# Patient Record
Sex: FEMALE | Race: WHITE | HISPANIC OR LATINO | Employment: PART TIME | URBAN - METROPOLITAN AREA
[De-identification: names, ages, dates, MRNs, and addresses within clinical notes are randomized per-mention and may not be internally consistent; named-entity substitution may affect disease eponyms.]

---

## 2018-08-27 ENCOUNTER — RECORDS - HEALTHEAST (OUTPATIENT)
Dept: LAB | Facility: CLINIC | Age: 23
End: 2018-08-27

## 2018-08-27 LAB
CLUE CELLS: ABNORMAL
TRICHOMONAS, WET PREP: ABNORMAL
YEAST, WET PREP: ABNORMAL

## 2018-08-28 LAB — C TRACH DNA SPEC QL PROBE+SIG AMP: NEGATIVE

## 2021-10-09 ENCOUNTER — APPOINTMENT (OUTPATIENT)
Dept: CT IMAGING | Facility: HOSPITAL | Age: 26
End: 2021-10-09
Attending: EMERGENCY MEDICINE

## 2021-10-09 ENCOUNTER — APPOINTMENT (OUTPATIENT)
Dept: RADIOLOGY | Facility: HOSPITAL | Age: 26
End: 2021-10-09

## 2021-10-09 ENCOUNTER — HOSPITAL ENCOUNTER (EMERGENCY)
Facility: HOSPITAL | Age: 26
Discharge: HOME | End: 2021-10-09
Attending: EMERGENCY MEDICINE | Admitting: EMERGENCY MEDICINE

## 2021-10-09 ENCOUNTER — APPOINTMENT (OUTPATIENT)
Dept: ULTRASOUND IMAGING | Facility: HOSPITAL | Age: 26
End: 2021-10-09

## 2021-10-09 VITALS
OXYGEN SATURATION: 99 % | SYSTOLIC BLOOD PRESSURE: 121 MMHG | WEIGHT: 181 LBS | HEART RATE: 69 BPM | TEMPERATURE: 98.4 F | DIASTOLIC BLOOD PRESSURE: 74 MMHG | RESPIRATION RATE: 18 BRPM | BODY MASS INDEX: 32.06 KG/M2

## 2021-10-09 DIAGNOSIS — R10.30 ABDOMINAL PAIN, LOWER: ICD-10-CM

## 2021-10-09 LAB
ALBUMIN SERPL-MCNC: 4.3 G/DL (ref 3.5–5)
ALBUMIN UR-MCNC: NEGATIVE MG/DL
ALP SERPL-CCNC: 90 U/L (ref 45–120)
ALT SERPL W P-5'-P-CCNC: 15 U/L (ref 0–45)
ANION GAP SERPL CALCULATED.3IONS-SCNC: 9 MMOL/L (ref 5–18)
APPEARANCE UR: CLEAR
AST SERPL W P-5'-P-CCNC: 18 U/L (ref 0–40)
BASOPHILS # BLD AUTO: 0 10E3/UL (ref 0–0.2)
BASOPHILS NFR BLD AUTO: 0 %
BILIRUB SERPL-MCNC: 0.4 MG/DL (ref 0–1)
BILIRUB UR QL STRIP: NEGATIVE
BUN SERPL-MCNC: 9 MG/DL (ref 8–22)
CALCIUM SERPL-MCNC: 9.8 MG/DL (ref 8.5–10.5)
CHLORIDE BLD-SCNC: 108 MMOL/L (ref 98–107)
CLUE CELLS: ABNORMAL
CO2 SERPL-SCNC: 25 MMOL/L (ref 22–31)
COLOR UR AUTO: ABNORMAL
CREAT SERPL-MCNC: 0.76 MG/DL (ref 0.6–1.1)
D DIMER PPP FEU-MCNC: <0.27 UG/ML FEU (ref 0–0.5)
EOSINOPHIL # BLD AUTO: 0 10E3/UL (ref 0–0.7)
EOSINOPHIL NFR BLD AUTO: 0 %
ERYTHROCYTE [DISTWIDTH] IN BLOOD BY AUTOMATED COUNT: 12.1 % (ref 10–15)
GFR SERPL CREATININE-BSD FRML MDRD: >90 ML/MIN/1.73M2
GLUCOSE BLD-MCNC: 103 MG/DL (ref 70–125)
GLUCOSE UR STRIP-MCNC: NEGATIVE MG/DL
HCG UR QL: NEGATIVE
HCT VFR BLD AUTO: 43.1 % (ref 35–47)
HGB BLD-MCNC: 14.2 G/DL (ref 11.7–15.7)
HGB UR QL STRIP: ABNORMAL
IMM GRANULOCYTES # BLD: 0.1 10E3/UL
IMM GRANULOCYTES NFR BLD: 0 %
INTERNAL QC OK POCT: NORMAL
KETONES UR STRIP-MCNC: NEGATIVE MG/DL
LEUKOCYTE ESTERASE UR QL STRIP: NEGATIVE
LIPASE SERPL-CCNC: 70 U/L (ref 0–52)
LYMPHOCYTES # BLD AUTO: 1.2 10E3/UL (ref 0.8–5.3)
LYMPHOCYTES NFR BLD AUTO: 8 %
MCH RBC QN AUTO: 30.9 PG (ref 26.5–33)
MCHC RBC AUTO-ENTMCNC: 32.9 G/DL (ref 31.5–36.5)
MCV RBC AUTO: 94 FL (ref 78–100)
MONOCYTES # BLD AUTO: 0.7 10E3/UL (ref 0–1.3)
MONOCYTES NFR BLD AUTO: 5 %
MUCOUS THREADS #/AREA URNS LPF: PRESENT /LPF
NEUTROPHILS # BLD AUTO: 12.1 10E3/UL (ref 1.6–8.3)
NEUTROPHILS NFR BLD AUTO: 87 %
NITRATE UR QL: NEGATIVE
NRBC # BLD AUTO: 0 10E3/UL
NRBC BLD AUTO-RTO: 0 /100
PH UR STRIP: 6 [PH] (ref 5–7)
PLATELET # BLD AUTO: 329 10E3/UL (ref 150–450)
POTASSIUM BLD-SCNC: 4.6 MMOL/L (ref 3.5–5)
PROT SERPL-MCNC: 7.9 G/DL (ref 6–8)
RBC # BLD AUTO: 4.59 10E6/UL (ref 3.8–5.2)
RBC URINE: 173 /HPF
SODIUM SERPL-SCNC: 142 MMOL/L (ref 136–145)
SP GR UR STRIP: 1.02 (ref 1–1.03)
SQUAMOUS EPITHELIAL: 1 /HPF
TRICHOMONAS, WET PREP: ABNORMAL
TROPONIN I SERPL-MCNC: 0.01 NG/ML (ref 0–0.29)
UROBILINOGEN UR STRIP-MCNC: <2 MG/DL
WBC # BLD AUTO: 14 10E3/UL (ref 4–11)
WBC URINE: 2 /HPF
WBC'S/HIGH POWER FIELD, WET PREP: ABNORMAL
YEAST, WET PREP: ABNORMAL

## 2021-10-09 PROCEDURE — 85025 COMPLETE CBC W/AUTO DIFF WBC: CPT | Performed by: STUDENT IN AN ORGANIZED HEALTH CARE EDUCATION/TRAINING PROGRAM

## 2021-10-09 PROCEDURE — 250N000011 HC RX IP 250 OP 636: Performed by: PHYSICIAN ASSISTANT

## 2021-10-09 PROCEDURE — 87491 CHLMYD TRACH DNA AMP PROBE: CPT | Performed by: EMERGENCY MEDICINE

## 2021-10-09 PROCEDURE — 76830 TRANSVAGINAL US NON-OB: CPT

## 2021-10-09 PROCEDURE — 96361 HYDRATE IV INFUSION ADD-ON: CPT

## 2021-10-09 PROCEDURE — 96375 TX/PRO/DX INJ NEW DRUG ADDON: CPT

## 2021-10-09 PROCEDURE — 250N000011 HC RX IP 250 OP 636: Performed by: EMERGENCY MEDICINE

## 2021-10-09 PROCEDURE — 83690 ASSAY OF LIPASE: CPT | Performed by: STUDENT IN AN ORGANIZED HEALTH CARE EDUCATION/TRAINING PROGRAM

## 2021-10-09 PROCEDURE — 258N000003 HC RX IP 258 OP 636: Performed by: PHYSICIAN ASSISTANT

## 2021-10-09 PROCEDURE — 85379 FIBRIN DEGRADATION QUANT: CPT | Performed by: PHYSICIAN ASSISTANT

## 2021-10-09 PROCEDURE — 71046 X-RAY EXAM CHEST 2 VIEWS: CPT

## 2021-10-09 PROCEDURE — 96374 THER/PROPH/DIAG INJ IV PUSH: CPT | Mod: 59

## 2021-10-09 PROCEDURE — 81025 URINE PREGNANCY TEST: CPT | Performed by: EMERGENCY MEDICINE

## 2021-10-09 PROCEDURE — 99285 EMERGENCY DEPT VISIT HI MDM: CPT | Mod: 25

## 2021-10-09 PROCEDURE — 81001 URINALYSIS AUTO W/SCOPE: CPT | Performed by: STUDENT IN AN ORGANIZED HEALTH CARE EDUCATION/TRAINING PROGRAM

## 2021-10-09 PROCEDURE — 36415 COLL VENOUS BLD VENIPUNCTURE: CPT | Performed by: PHYSICIAN ASSISTANT

## 2021-10-09 PROCEDURE — 93005 ELECTROCARDIOGRAM TRACING: CPT | Performed by: PHYSICIAN ASSISTANT

## 2021-10-09 PROCEDURE — 84484 ASSAY OF TROPONIN QUANT: CPT | Performed by: PHYSICIAN ASSISTANT

## 2021-10-09 PROCEDURE — 82040 ASSAY OF SERUM ALBUMIN: CPT | Performed by: STUDENT IN AN ORGANIZED HEALTH CARE EDUCATION/TRAINING PROGRAM

## 2021-10-09 PROCEDURE — 87210 SMEAR WET MOUNT SALINE/INK: CPT | Performed by: EMERGENCY MEDICINE

## 2021-10-09 PROCEDURE — 74177 CT ABD & PELVIS W/CONTRAST: CPT

## 2021-10-09 PROCEDURE — 81025 URINE PREGNANCY TEST: CPT | Performed by: STUDENT IN AN ORGANIZED HEALTH CARE EDUCATION/TRAINING PROGRAM

## 2021-10-09 RX ORDER — MORPHINE SULFATE 4 MG/ML
4 INJECTION, SOLUTION INTRAMUSCULAR; INTRAVENOUS ONCE
Status: COMPLETED | OUTPATIENT
Start: 2021-10-09 | End: 2021-10-09

## 2021-10-09 RX ORDER — OXYCODONE AND ACETAMINOPHEN 5; 325 MG/1; MG/1
1 TABLET ORAL ONCE
Status: DISCONTINUED | OUTPATIENT
Start: 2021-10-09 | End: 2021-10-09

## 2021-10-09 RX ORDER — IOPAMIDOL 755 MG/ML
100 INJECTION, SOLUTION INTRAVASCULAR ONCE
Status: COMPLETED | OUTPATIENT
Start: 2021-10-09 | End: 2021-10-09

## 2021-10-09 RX ORDER — ONDANSETRON 2 MG/ML
4 INJECTION INTRAMUSCULAR; INTRAVENOUS ONCE
Status: COMPLETED | OUTPATIENT
Start: 2021-10-09 | End: 2021-10-09

## 2021-10-09 RX ORDER — TIZANIDINE 2 MG/1
2 TABLET ORAL
COMMUNITY

## 2021-10-09 RX ORDER — ONDANSETRON 4 MG/1
4 TABLET, ORALLY DISINTEGRATING ORAL ONCE
Status: DISCONTINUED | OUTPATIENT
Start: 2021-10-09 | End: 2021-10-09

## 2021-10-09 RX ADMIN — ONDANSETRON 4 MG: 2 INJECTION INTRAMUSCULAR; INTRAVENOUS at 13:45

## 2021-10-09 RX ADMIN — MORPHINE SULFATE 4 MG: 4 INJECTION INTRAVENOUS at 13:46

## 2021-10-09 RX ADMIN — IOPAMIDOL 100 ML: 755 INJECTION, SOLUTION INTRAVENOUS at 15:50

## 2021-10-09 RX ADMIN — SODIUM CHLORIDE 1000 ML: 9 INJECTION, SOLUTION INTRAVENOUS at 13:43
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
## 2021-10-09 NOTE — ED TRIAGE NOTES
"Lower abdominal cramping \"way worse than my usual menstrual cramps\" along with some minor vaginal bleeding. Denies nausea, endorses 1 episode of emesis this AM. Denies CP, Denies SOB, Denies LOC, Denies blood in stool, denies urinary symptoms.  "

## 2021-10-09 NOTE — ED PROVIDER NOTES
"  Emergency Department Encounter     Evaluation Date & Time:   10/9/2021  1:59 PM    CHIEF COMPLAINT:  Abdominal Pain and menstrual cramping      Triage Note:Lower abdominal cramping \"way worse than my usual menstrual cramps\" along with some minor vaginal bleeding. Denies nausea, endorses 1 episode of emesis this AM. Denies CP, Denies SOB, Denies LOC, Denies blood in stool, denies urinary symptoms.        Impression and Plan       FINAL IMPRESSION:    ICD-10-CM    1. Abdominal pain, lower  R10.30          ED COURSE & MEDICAL DECISION MAKIN:12 PM Consulted on patient by Barbara Schwab PA-C.  3:20 PM  Introduced myself to the patient and updated her on the current treatment plan. PPE: Provider wore gloves, N95 mask, eye protection, surgical cap, and paper mask.   5:03 PM Discussed discharge with the patient who is agreeable at this time.    25 year old female, otherwise healthy, who presents for evaluation of lower abdominal cramping pain associated with her menstrual cycle, which started one week early. She reports associated nausea with vomiting. Denies diarrhea, urinary symptoms, abnormal vaginal bleeding, fevers.    Pelvic ultrasound unremarkable.    UPT negative.  UA negative for signs of infection.    Labs otherwise remarkable for leukocytosis (WBC 14.0) with no anemia.  She has no significant electrolyte derangements or renal impairment.  No laboratory evidence of hepatitis or pancreatitis (lipase minimally elevated to 70).    On my exam, abdomen is soft with minimal tenderness to palpation most prominently to the lower abdomen.     On pelvic exam, there is a small amount of blood in the vaginal vault consistent with menses with no vaginal discharge. No CMT or tenderness in either adnexal region.    CT abdomen / pelvis performed and was normal.    I suspect pain is secondary to severe menstrual cramping.  Nothing on exam to suggest PID.  Wet prep was negative with gonorrhea / chlamydia amplifications " "pending.  Patient declined empiric treatment for STDs.    Patient discharged home with follow-up PMD.  Return precautions provided.  Patient stable throughout ED course.      At the conclusion of the encounter I discussed the results of all the tests and the disposition. The questions were answered. The patient and family acknowledged understanding and were agreeable with the care plan.      MEDICATIONS GIVEN IN THE EMERGENCY DEPARTMENT:  Medications   morphine (PF) injection 4 mg (4 mg Intravenous Given 10/9/21 1346)   ondansetron (ZOFRAN) injection 4 mg (4 mg Intravenous Given 10/9/21 1345)   0.9% sodium chloride BOLUS (0 mLs Intravenous Stopped 10/9/21 1443)   iopamidol (ISOVUE-370) solution 100 mL (100 mLs Intravenous Given 10/9/21 1550)       NEW PRESCRIPTIONS STARTED AT TODAY'S ED VISIT:  Discharge Medication List as of 10/9/2021  5:06 PM          HPI     The history is provided by the patient.      Dolores Box is a 25 year old female with a limited medical history of unspecified mouth surgery, who presents to this ED for evaluation of abdominal pain and cramping.    Patient reports lower abdominal cramping since this morning when she woke up with associated nausea and vomiting. She reports that lower abdominal pain is worse than mid abdomen. She notes that she is on her menstrual period currently and reports that current abdominal pain is \"way more intense\" than usual and that nausea and vomiting are new. She reports taking 800 mg Ibuprofen around 0930 without relief, and states that typically 400 mg Ibuprofen would provide relief. Patient reports that the start of her last menstrual cycle was on 9/16 or 9/17 and states that this cycle started a little earlier than usual.      Patient reports chronic back pain and some intermittent \"sharp and intense\" chest pain for the past month. Patient states she has an upcoming appointment for chest pain. Denies any dysuria, history of endometriosis, birth " control use, and estrogen use. Patient typically takes Tizanidine for back pain.    REVIEW OF SYSTEMS:  All other systems reviewed and are negative.      Medical History     History reviewed. No pertinent past medical history.    Past Surgical History:   Procedure Laterality Date     MOUTH SURGERY         History reviewed. No pertinent family history.    Social History     Tobacco Use     Smoking status: Current Some Day Smoker   Substance Use Topics     Alcohol use: No     Drug use: No       tiZANidine (ZANAFLEX) 2 MG tablet        Physical Exam     First Vitals:  Patient Vitals for the past 24 hrs:   BP Temp Temp src Pulse Resp SpO2 Weight   10/09/21 1701 121/74 -- -- 69 18 99 % --   10/09/21 1147 (!) 143/86 98.4  F (36.9  C) Temporal 101 18 99 % 82.1 kg (181 lb)       PHYSICAL EXAM:   Physical Exam    GENERAL: Awake, alert.  In no acute distress.   HEENT: Normocephalic, atraumatic. Pupils equal, round and reactive. Conjunctiva normal.   NECK: No stridor.  PULMONARY: Symmetrical breath sounds without distress.  Lungs clear to auscultation bilaterally without wheezes, rhonchi or rales.  CARDIO: Regular rate and rhythm.  No significant murmur, rub or gallop.  Radial pulses strong and symmetrical.  ABDOMINAL: Abdomen soft, non-distended with mild tenderness to palpation across lower abdomen; no rebound tenderness or guarding.   PELVIC:  There is a small amount of blood in the vaginal vault consistent with menstrual cycle; no vaginal discharge.  No CMT or tenderness in either adnexal region.  EXTREMITIES: No lower extremity swelling or edema.      NEURO: Alert and oriented to person, place and time.  Cranial nerves grossly intact.  No focal motor deficit.  PSYCH: Normal mood and affect.  SKIN: No rashes.     Results     LAB:  All pertinent labs reviewed and interpreted  Labs Ordered and Resulted from Time of ED Arrival Up to the Time of Departure from the ED   COMPREHENSIVE METABOLIC PANEL - Abnormal; Notable for the  following components:       Result Value    Chloride 108 (*)     All other components within normal limits   LIPASE - Abnormal; Notable for the following components:    Lipase 70 (*)     All other components within normal limits   ROUTINE UA WITH MICROSCOPIC REFLEX TO CULTURE - Abnormal; Notable for the following components:    Blood Urine >1.0 mg/dL (*)     Mucus Urine Present (*)     RBC Urine 173 (*)     All other components within normal limits    Narrative:     Urine Culture not indicated   CBC WITH PLATELETS AND DIFFERENTIAL - Abnormal; Notable for the following components:    WBC Count 14.0 (*)     Absolute Neutrophils 12.1 (*)     Absolute Immature Granulocytes 0.1 (*)     All other components within normal limits   WET PREPARATION - Abnormal; Notable for the following components:    WBCs/high power field 2+ (*)     All other components within normal limits   D DIMER QUANTITATIVE - Normal    Narrative:     This D-dimer assay is intended for use in conjunction with a clinical pretest probability assessment model to exclude pulmonary embolism (PE) and deep venous thrombosis (DVT) in outpatients suspected of PE or DVT. The cut-off value is 0.50 ug/mL FEU.   TROPONIN I - Normal   HCG QUALITATIVE URINE POCT - Normal   PREP FOR PROCEDURE   CBC WITH PLATELETS & DIFFERENTIAL    Narrative:     The following orders were created for panel order CBC with platelets differential.  Procedure                               Abnormality         Status                     ---------                               -----------         ------                     CBC with platelets and d...[[id number]]  Abnormal            Final result                 Please view results for these tests on the individual orders.       RADIOLOGY:  CT Abdomen Pelvis w Contrast   Final Result   IMPRESSION:    1.  Normal CT of the abdomen and pelvis.      US Pelvic Complete with Transvaginal   Final Result   IMPRESSION:   1.  Normal pelvic ultrasound.                Chest XR,  PA & LAT   Final Result   IMPRESSION: Negative chest.              ECG:  10/09/2021, 13:06; NSR with rate of 74 bpm; normal intervals; normal conduction; no ST-T wave changes consistent with ACS or pericarditis; compared to previous EKG dated 10/22/2018, there are no significant changes    EKG independently reviewed and interpreted by Diane Boyer MD        I, Trini Ragsdale, am serving as a scribe to document services personally performed by Diane Boyer MD based on my observation and the provider's statements to me. I, Diane Boyer MD attest that Trini Ragsdale is acting in a scribe capacity, has observed my performance of the services and has documented them in accordance with my direction.    Diane Boyer MD  Emergency Medicine  [facility] EMERGENCY DEPARTMENT           Diane Boyer MD  10/10/21 1108

## 2021-10-09 NOTE — DISCHARGE INSTRUCTIONS
Please follow-up with your Primary Care Provider this upcoming week for a recheck; call to arrange appointment.    Return to the ER for worsening symptoms, worsening pain, persistent nausea / vomiting, fever or other concerns.

## 2021-10-10 LAB
ATRIAL RATE - MUSE: 74 BPM
DIASTOLIC BLOOD PRESSURE - MUSE: NORMAL MMHG
INTERPRETATION ECG - MUSE: NORMAL
P AXIS - MUSE: 64 DEGREES
PR INTERVAL - MUSE: 156 MS
QRS DURATION - MUSE: 72 MS
QT - MUSE: 382 MS
QTC - MUSE: 424 MS
R AXIS - MUSE: 45 DEGREES
SYSTOLIC BLOOD PRESSURE - MUSE: NORMAL MMHG
T AXIS - MUSE: 31 DEGREES
VENTRICULAR RATE- MUSE: 74 BPM

## 2024-07-01 ENCOUNTER — TRANSFERRED RECORDS (OUTPATIENT)
Dept: MULTI SPECIALTY CLINIC | Facility: CLINIC | Age: 29
End: 2024-07-01

## 2024-07-01 LAB — PAP SMEAR - HIM PATIENT REPORTED: NORMAL

## 2024-07-03 LAB
ABO (EXTERNAL): NORMAL
HEPATITIS B SURFACE ANTIGEN (EXTERNAL): NONREACTIVE
RH (EXTERNAL): POSITIVE
RUBELLA ANTIBODY IGG (EXTERNAL): NORMAL

## 2024-07-16 ENCOUNTER — APPOINTMENT (OUTPATIENT)
Dept: ULTRASOUND IMAGING | Facility: HOSPITAL | Age: 29
End: 2024-07-16
Attending: EMERGENCY MEDICINE

## 2024-07-16 ENCOUNTER — HOSPITAL ENCOUNTER (EMERGENCY)
Facility: HOSPITAL | Age: 29
Discharge: HOME | End: 2024-07-17
Attending: STUDENT IN AN ORGANIZED HEALTH CARE EDUCATION/TRAINING PROGRAM | Admitting: STUDENT IN AN ORGANIZED HEALTH CARE EDUCATION/TRAINING PROGRAM

## 2024-07-16 ENCOUNTER — APPOINTMENT (OUTPATIENT)
Dept: MRI IMAGING | Facility: HOSPITAL | Age: 29
End: 2024-07-16
Attending: EMERGENCY MEDICINE

## 2024-07-16 DIAGNOSIS — R10.9 ABDOMINAL PAIN: ICD-10-CM

## 2024-07-16 LAB
ABO/RH(D): NORMAL
ALBUMIN SERPL BCG-MCNC: 4.3 G/DL (ref 3.5–5.2)
ALBUMIN UR-MCNC: NEGATIVE MG/DL
ALP SERPL-CCNC: 62 U/L (ref 40–150)
ALT SERPL W P-5'-P-CCNC: 22 U/L (ref 0–50)
ANION GAP SERPL CALCULATED.3IONS-SCNC: 12 MMOL/L (ref 7–15)
ANTIBODY SCREEN: NEGATIVE
APPEARANCE UR: CLEAR
AST SERPL W P-5'-P-CCNC: 22 U/L (ref 0–45)
BACTERIA #/AREA URNS HPF: ABNORMAL /HPF
BILIRUB DIRECT SERPL-MCNC: <0.2 MG/DL (ref 0–0.3)
BILIRUB SERPL-MCNC: 0.3 MG/DL
BILIRUB UR QL STRIP: NEGATIVE
BUN SERPL-MCNC: 9.9 MG/DL (ref 6–20)
CALCIUM SERPL-MCNC: 9.8 MG/DL (ref 8.8–10.4)
CHLORIDE SERPL-SCNC: 101 MMOL/L (ref 98–107)
COLOR UR AUTO: COLORLESS
CREAT SERPL-MCNC: 0.6 MG/DL (ref 0.51–0.95)
EGFRCR SERPLBLD CKD-EPI 2021: >90 ML/MIN/1.73M2
ERYTHROCYTE [DISTWIDTH] IN BLOOD BY AUTOMATED COUNT: 11.7 % (ref 10–15)
GLUCOSE SERPL-MCNC: 90 MG/DL (ref 70–99)
GLUCOSE UR STRIP-MCNC: NEGATIVE MG/DL
HCG INTACT+B SERPL-ACNC: ABNORMAL MIU/ML
HCO3 SERPL-SCNC: 21 MMOL/L (ref 22–29)
HCT VFR BLD AUTO: 36 % (ref 35–47)
HGB BLD-MCNC: 12.7 G/DL (ref 11.7–15.7)
HGB UR QL STRIP: NEGATIVE
KETONES UR STRIP-MCNC: ABNORMAL MG/DL
LEUKOCYTE ESTERASE UR QL STRIP: NEGATIVE
LIPASE SERPL-CCNC: 43 U/L (ref 13–60)
MCH RBC QN AUTO: 31.6 PG (ref 26.5–33)
MCHC RBC AUTO-ENTMCNC: 35.3 G/DL (ref 31.5–36.5)
MCV RBC AUTO: 90 FL (ref 78–100)
MUCOUS THREADS #/AREA URNS LPF: PRESENT /LPF
NITRATE UR QL: NEGATIVE
PH UR STRIP: 6 [PH] (ref 5–7)
PLATELET # BLD AUTO: 297 10E3/UL (ref 150–450)
POTASSIUM SERPL-SCNC: 3.9 MMOL/L (ref 3.4–5.3)
PROT SERPL-MCNC: 7.1 G/DL (ref 6.4–8.3)
RBC # BLD AUTO: 4.02 10E6/UL (ref 3.8–5.2)
RBC URINE: 1 /HPF
SODIUM SERPL-SCNC: 134 MMOL/L (ref 135–145)
SP GR UR STRIP: 1.01 (ref 1–1.03)
SPECIMEN EXPIRATION DATE: NORMAL
SQUAMOUS EPITHELIAL: 2 /HPF
UROBILINOGEN UR STRIP-MCNC: <2 MG/DL
WBC # BLD AUTO: 16.1 10E3/UL (ref 4–11)
WBC URINE: <1 /HPF

## 2024-07-16 PROCEDURE — 96361 HYDRATE IV INFUSION ADD-ON: CPT

## 2024-07-16 PROCEDURE — 96360 HYDRATION IV INFUSION INIT: CPT

## 2024-07-16 PROCEDURE — 99284 EMERGENCY DEPT VISIT MOD MDM: CPT | Mod: 25

## 2024-07-16 PROCEDURE — 82248 BILIRUBIN DIRECT: CPT | Performed by: EMERGENCY MEDICINE

## 2024-07-16 PROCEDURE — 36415 COLL VENOUS BLD VENIPUNCTURE: CPT | Performed by: EMERGENCY MEDICINE

## 2024-07-16 PROCEDURE — 250N000013 HC RX MED GY IP 250 OP 250 PS 637: Performed by: EMERGENCY MEDICINE

## 2024-07-16 PROCEDURE — 84702 CHORIONIC GONADOTROPIN TEST: CPT | Performed by: EMERGENCY MEDICINE

## 2024-07-16 PROCEDURE — 82310 ASSAY OF CALCIUM: CPT | Performed by: EMERGENCY MEDICINE

## 2024-07-16 PROCEDURE — 86900 BLOOD TYPING SEROLOGIC ABO: CPT | Performed by: EMERGENCY MEDICINE

## 2024-07-16 PROCEDURE — 99285 EMERGENCY DEPT VISIT HI MDM: CPT | Mod: 25

## 2024-07-16 PROCEDURE — 96375 TX/PRO/DX INJ NEW DRUG ADDON: CPT

## 2024-07-16 PROCEDURE — 85027 COMPLETE CBC AUTOMATED: CPT | Performed by: EMERGENCY MEDICINE

## 2024-07-16 PROCEDURE — 76801 OB US < 14 WKS SINGLE FETUS: CPT

## 2024-07-16 PROCEDURE — 74181 MRI ABDOMEN W/O CONTRAST: CPT

## 2024-07-16 PROCEDURE — 81001 URINALYSIS AUTO W/SCOPE: CPT | Performed by: EMERGENCY MEDICINE

## 2024-07-16 PROCEDURE — 83690 ASSAY OF LIPASE: CPT | Performed by: EMERGENCY MEDICINE

## 2024-07-16 PROCEDURE — 96374 THER/PROPH/DIAG INJ IV PUSH: CPT

## 2024-07-16 PROCEDURE — 76705 ECHO EXAM OF ABDOMEN: CPT

## 2024-07-16 RX ORDER — ACETAMINOPHEN 325 MG/1
975 TABLET ORAL ONCE
Status: COMPLETED | OUTPATIENT
Start: 2024-07-16 | End: 2024-07-16

## 2024-07-16 RX ADMIN — ACETAMINOPHEN 975 MG: 325 TABLET ORAL at 20:53

## 2024-07-16 ASSESSMENT — COLUMBIA-SUICIDE SEVERITY RATING SCALE - C-SSRS
6. HAVE YOU EVER DONE ANYTHING, STARTED TO DO ANYTHING, OR PREPARED TO DO ANYTHING TO END YOUR LIFE?: NO
2. HAVE YOU ACTUALLY HAD ANY THOUGHTS OF KILLING YOURSELF IN THE PAST MONTH?: NO
1. IN THE PAST MONTH, HAVE YOU WISHED YOU WERE DEAD OR WISHED YOU COULD GO TO SLEEP AND NOT WAKE UP?: NO

## 2024-07-16 ASSESSMENT — ACTIVITIES OF DAILY LIVING (ADL)
ADLS_ACUITY_SCORE: 35

## 2024-07-17 VITALS
WEIGHT: 192 LBS | TEMPERATURE: 98 F | RESPIRATION RATE: 16 BRPM | DIASTOLIC BLOOD PRESSURE: 60 MMHG | HEIGHT: 63 IN | OXYGEN SATURATION: 99 % | BODY MASS INDEX: 34.02 KG/M2 | SYSTOLIC BLOOD PRESSURE: 109 MMHG | HEART RATE: 89 BPM

## 2024-07-17 PROCEDURE — 258N000003 HC RX IP 258 OP 636: Performed by: STUDENT IN AN ORGANIZED HEALTH CARE EDUCATION/TRAINING PROGRAM

## 2024-07-17 PROCEDURE — 96374 THER/PROPH/DIAG INJ IV PUSH: CPT

## 2024-07-17 PROCEDURE — 250N000011 HC RX IP 250 OP 636: Performed by: STUDENT IN AN ORGANIZED HEALTH CARE EDUCATION/TRAINING PROGRAM

## 2024-07-17 PROCEDURE — 96361 HYDRATE IV INFUSION ADD-ON: CPT

## 2024-07-17 PROCEDURE — 96375 TX/PRO/DX INJ NEW DRUG ADDON: CPT

## 2024-07-17 PROCEDURE — 250N000013 HC RX MED GY IP 250 OP 250 PS 637: Performed by: STUDENT IN AN ORGANIZED HEALTH CARE EDUCATION/TRAINING PROGRAM

## 2024-07-17 RX ORDER — MORPHINE SULFATE 4 MG/ML
4 INJECTION, SOLUTION INTRAMUSCULAR; INTRAVENOUS ONCE
Status: COMPLETED | OUTPATIENT
Start: 2024-07-17 | End: 2024-07-17

## 2024-07-17 RX ORDER — HYDROCODONE BITARTRATE AND ACETAMINOPHEN 5; 325 MG/1; MG/1
1 TABLET ORAL EVERY 6 HOURS PRN
Qty: 4 TABLET | Refills: 0 | Status: SHIPPED | OUTPATIENT
Start: 2024-07-17 | End: 2024-07-20

## 2024-07-17 RX ORDER — ONDANSETRON 4 MG/1
4 TABLET, ORALLY DISINTEGRATING ORAL EVERY 8 HOURS PRN
Qty: 10 TABLET | Refills: 0 | Status: SHIPPED | OUTPATIENT
Start: 2024-07-17 | End: 2024-07-20

## 2024-07-17 RX ORDER — ONDANSETRON 2 MG/ML
4 INJECTION INTRAMUSCULAR; INTRAVENOUS ONCE
Status: COMPLETED | OUTPATIENT
Start: 2024-07-17 | End: 2024-07-17

## 2024-07-17 RX ADMIN — SODIUM CHLORIDE, POTASSIUM CHLORIDE, SODIUM LACTATE AND CALCIUM CHLORIDE 1000 ML: 600; 310; 30; 20 INJECTION, SOLUTION INTRAVENOUS at 01:01

## 2024-07-17 RX ADMIN — MORPHINE SULFATE 4 MG: 4 INJECTION, SOLUTION INTRAMUSCULAR; INTRAVENOUS at 01:00

## 2024-07-17 RX ADMIN — AMOXICILLIN AND CLAVULANATE POTASSIUM 1 TABLET: 875; 125 TABLET, FILM COATED ORAL at 01:20

## 2024-07-17 RX ADMIN — ONDANSETRON 4 MG: 2 INJECTION INTRAMUSCULAR; INTRAVENOUS at 01:01

## 2024-07-17 ASSESSMENT — ACTIVITIES OF DAILY LIVING (ADL)
ADLS_ACUITY_SCORE: 35
ADLS_ACUITY_SCORE: 35

## 2024-07-17 NOTE — DISCHARGE INSTRUCTIONS
Please take antibiotics as prescribed.  Take pain medication as needed.    Push oral hydration as best you can.    Continue to take your Unisom and vitamin B6.    Return here for any worsening symptoms including worsening pain, fevers, persistent vomiting and inability to tolerate liquids, or any other concerning symptoms

## 2024-07-17 NOTE — ED PROVIDER NOTES
EMERGENCY DEPARTMENT ENCOUNTER      NAME: Dolores Box  AGE: 28 year old female  YOB: 1995  MRN: [id number]  EVALUATION DATE & TIME: 2024  8:18 PM    PCP: [facility] PROVIDER: Lesly Bush PA-C      Chief Complaint   Patient presents with    Pelvic Pain         FINAL IMPRESSION:  1. Abdominal pain          MEDICAL DECISION MAKING:    Pertinent Labs & Imaging studies reviewed. (See chart for details)  Dolores Box is a 28 year old  female approximately 12 weeks pregnant who presents to this ED via private vehicle for evaluation of abdominal pain.  Earlier today patient started to have lower abdominal cramping.  Cramping was mild however, throughout the day became very severe so much so that she was not able to walk due to how bad her pain was.  She called the nurse line and they recommended she come to the emergency department.  On my evaluation, patient vitally stable but did appear uncomfortable.  Slightly elevated temperature of 100  F.  Abdomen soft with tenderness in the lower abdomen including the right lower quadrant.  No rebound or guarding.  Differential diagnosis included UTI, appendicitis, miscarriage.    UA without signs of infection.  Type and screen with Rh+.  Quantitative beta-hCG elevated at 76,679.  LFTs and lipase within normal limits.  BMP without significant derangement.  CBC with elevated white blood cell count of 16.1.  At this time, with tenderness in the right lower quadrant discussed need for MRI for rule out of appendicitis.  Unfortunately, patient had a house arrest bracelet and after discussion with her we will proceed with ultrasound of the appendix as well as OB ultrasound.  Unfortunately, unable to visualize appendix on ultrasound.  After she returned, she was able to get a hold of the company for the house arrest placed bracelets.  I was given the number for sure who is the manager there who is aware of the  patient's situation.  She instructed me that I am able to cut off the house arrest bracelet and instructed me how to do so.  She also told me that the patient needs to have this with her when she returns to the facility tomorrow to get a house arrest replaced.  Patient was in agreement and understanding with the plan of care to proceed with MRI of the abdomen to rule out appendicitis.  Laboratory evaluation is otherwise reassuring which was discussed with patient as well as normal OB ultrasound.  Patient was signed out to my colleague Dr. Usman Lind pending MRI and disposition.    Medical Decision Making  Obtained supplemental history:Supplemental history obtained?: No  Reviewed external records: External records reviewed?: No  Care impacted by chronic illness:N/A  Care significantly affected by social determinants of health:Access to Medical Care  Did you consider but not order tests?: Work up considered but not performed and documented in chart, if applicable  Did you interpret images independently?: Independent interpretation of ECG and images noted in documentation, when applicable.  Consultation discussion with other provider:Did you involve another provider (consultant, , pharmacy, etc.)?: No  Patient signed out to my colleague Dr. Lind pending MRI and disposition.     ED COURSE:  8:30 PM I met with the patient, obtained history, performed an initial exam, and discussed options and plan for diagnostics and treatment here in the ED.    9:43 PM Nursing staff informed me that patient has a house arrest bracelet. Discussed with patient trying to obtain US of appendix first and have her call the company to see if we are able to remove if needed.     11:15 PM I staffed the patient with Dr. Usman Lind.    11:18 PM I spoke with Nadeem who is the manager of the company for the house arrest bracelet (number [phone]). She is aware of the situation and instructed me to cut the bracelet off, make sure the patient has it  and make sure she brings it with her tomorrow to get a new one placed.     At the conclusion of the encounter I discussed the results of all of the tests and the disposition. The questions were answered. The patient or family acknowledged understanding and was agreeable with the care plan.     MEDICATIONS GIVEN IN THE EMERGENCY:  Medications   acetaminophen (TYLENOL) tablet 975 mg (975 mg Oral $Given 24)       NEW PRESCRIPTIONS STARTED AT TODAY'S ER VISIT  New Prescriptions    No medications on file            =================================================================    HPI:    Patient information was obtained from: the patient    Use of Interpretor: N/A          Dolores Box is a 28 year old  female approximately 12 weeks pregnant who presents to this ED via private vehicle for evaluation of abdominal pain.  Earlier today patient started to have lower abdominal cramping.  Cramping was mild however, throughout the day became very severe so much so that she was not able to walk due to how bad her pain was.  She called the nurse line and they recommended she come to the emergency department.  On my evaluation, patient denies any urinary symptoms or vaginal bleeding or discharge.  No fevers or chills.  She has been nauseated but no vomiting.  She is unclear whether she took any Tylenol earlier today as she pulled it out take but got busy at work.  No upper abdominal pain.  No diarrhea or bloody stools.  Of note, patient is on house arrest and has house arrest anklet.      REVIEW OF SYSTEMS:  Negative unless otherwise stated in the above HPI.       PAST MEDICAL HISTORY:  No past medical history on file.    PAST SURGICAL HISTORY:  Past Surgical History:   Procedure Laterality Date    MOUTH SURGERY             CURRENT MEDICATIONS:    No current facility-administered medications for this encounter.    Current Outpatient Medications:     tiZANidine (ZANAFLEX) 2 MG tablet, Take 2 mg by  "mouth nightly as needed for back pain, Disp: , Rfl:       ALLERGIES:  No Known Allergies    FAMILY HISTORY:  No family history on file.    SOCIAL HISTORY:   Social History     Socioeconomic History    Marital status: Single   Tobacco Use    Smoking status: Some Days   Substance and Sexual Activity    Alcohol use: No    Drug use: No     Social Determinants of Health     Financial Resource Strain: Not on File (3/8/2021)    Received from [facility]    Financial Resource Strain     Financial Resource Strain: 0   Food Insecurity: Not on File (3/8/2021)    Received from [facility]    Food Insecurity     Food: 0   Transportation Needs: Not on File (3/8/2021)    Received from [facility]    Transportation Needs     Transportation: 0   Physical Activity: Not on File (3/8/2021)    Received from [facility]    Physical Activity     Physical Activity: 0   Stress: Not on File (3/8/2021)    Received from [facility]    Stress     Stress: 0   Social Connections: Not on File (3/8/2021)    Received from [facility]    Social Connections     Social Connections and Isolation: 0   Housing Stability: Not on File (3/8/2021)    Received from [employer]    Housing Stability     Housin       VITALS:  Patient Vitals for the past 24 hrs:   BP Temp Temp src Pulse Resp SpO2 Height Weight   24 2200 133/81 -- -- 92 -- 100 % -- --   24 2130 123/69 -- -- 76 -- 100 % -- --   24 2045 114/58 -- -- -- 16 -- -- --   24 -- -- -- -- -- -- 1.6 m (5' 3\") 87.1 kg (192 lb)   24 -- 98.1  F (36.7  C) Oral -- -- -- -- --   24 132/84 100  F (37.8  C) Temporal 89 18 98 % -- --       PHYSICAL EXAM    Constitutional: Well developed, Well nourished, NAD  HENT: Normocephalic, Atraumatic, Bilateral external ears normal, Oropharynx normal, mucous membranes moist, Nose normal.   Neck: Normal range of motion, No tenderness, Supple, No stridor.  Eyes: eyes track normally throughout exam, Conjunctiva normal, No discharge.   Respiratory: Normal breath sounds, " No respiratory distress, No wheezing, Speaks full sentences easily. No cough.  Cardiovascular: Normal heart rate, Regular rhythm, No murmurs, No rubs, No gallops. Chest wall nontender.  GI: Soft, tenderness in the lower abdomen, including the right lower quadrant, No masses, No flank tenderness. No rebound or guarding.  Musculoskeletal: Good range of motion in all major joints.   Integument: Warm, Dry, No erythema, No rash. No petechiae.  Neurologic: Alert & oriented x 3, Normal motor function, Normal sensory function, No focal deficits noted. Normal gait.  Psychiatric: Affect normal, Judgment normal, Mood normal. Cooperative.    LAB:  All pertinent labs reviewed and interpreted.  Recent Results (from the past 24 hour(s))   CBC (+ platelets, no diff)    Collection Time: 07/16/24  9:00 PM   Result Value Ref Range    WBC Count 16.1 (H) 4.0 - 11.0 10e3/uL    RBC Count 4.02 3.80 - 5.20 10e6/uL    Hemoglobin 12.7 11.7 - 15.7 g/dL    Hematocrit 36.0 35.0 - 47.0 %    MCV 90 78 - 100 fL    MCH 31.6 26.5 - 33.0 pg    MCHC 35.3 31.5 - 36.5 g/dL    RDW 11.7 10.0 - 15.0 %    Platelet Count 297 150 - 450 10e3/uL   Basic metabolic panel    Collection Time: 07/16/24  9:00 PM   Result Value Ref Range    Sodium 134 (L) 135 - 145 mmol/L    Potassium 3.9 3.4 - 5.3 mmol/L    Chloride 101 98 - 107 mmol/L    Carbon Dioxide (CO2) 21 (L) 22 - 29 mmol/L    Anion Gap 12 7 - 15 mmol/L    Urea Nitrogen 9.9 6.0 - 20.0 mg/dL    Creatinine 0.60 0.51 - 0.95 mg/dL    GFR Estimate >90 >60 mL/min/1.73m2    Calcium 9.8 8.8 - 10.4 mg/dL    Glucose 90 70 - 99 mg/dL   Hepatic function panel    Collection Time: 07/16/24  9:00 PM   Result Value Ref Range    Protein Total 7.1 6.4 - 8.3 g/dL    Albumin 4.3 3.5 - 5.2 g/dL    Bilirubin Total 0.3 <=1.2 mg/dL    Alkaline Phosphatase 62 40 - 150 U/L    AST 22 0 - 45 U/L    ALT 22 0 - 50 U/L    Bilirubin Direct <0.20 0.00 - 0.30 mg/dL   Lipase    Collection Time: 07/16/24  9:00 PM   Result Value Ref Range     Lipase 43 13 - 60 U/L   HCG quantitative pregnancy (blood)    Collection Time: 07/16/24  9:00 PM   Result Value Ref Range    hCG Quantitative 76,679 (H) <5 mIU/mL   Adult Type and Screen    Collection Time: 07/16/24  9:00 PM   Result Value Ref Range    ABO/RH(D) O POS     Antibody Screen Negative Negative    SPECIMEN EXPIRATION DATE [id number]    UA with Microscopic reflex to Culture    Collection Time: 07/16/24 10:05 PM    Specimen: Urine, Clean Catch   Result Value Ref Range    Color Urine Colorless Colorless, Straw, Light Yellow, Yellow    Appearance Urine Clear Clear    Glucose Urine Negative Negative mg/dL    Bilirubin Urine Negative Negative    Ketones Urine Trace (A) Negative mg/dL    Specific Gravity Urine 1.008 1.001 - 1.030    Blood Urine Negative Negative    pH Urine 6.0 5.0 - 7.0    Protein Albumin Urine Negative Negative mg/dL    Urobilinogen Urine <2.0 <2.0 mg/dL    Nitrite Urine Negative Negative    Leukocyte Esterase Urine Negative Negative    Bacteria Urine Few (A) None Seen /HPF    Mucus Urine Present (A) None Seen /LPF    RBC Urine 1 <=2 /HPF    WBC Urine <1 <=5 /HPF    Squamous Epithelials Urine 2 (H) <=1 /HPF         RADIOLOGY:  Reviewed all pertinent imaging. Please see official radiology report.  US OB < 14 Weeks Single   Final Result   IMPRESSION:    1.  Single living intrauterine gestation at 11 weeks 4 days, EDC 01/31/2025.            US Appendix Only   Final Result   IMPRESSION:   1.  The appendix is not seen in the right lower quadrant, this is indeterminate.      2.  No secondary findings of acute appendicitis identified such as enlarged lymph nodes or free fluid.            MR Appendix wo Contrast    (Results Pending)       PROCEDURES:   None.     Lesly Bush PA-C  Emergency Medicine  [facility]  7/16/2024      Lesly Bush PA-C  07/16/24 3668       Lesly Bush PA-C  07/16/24 9770

## 2024-07-17 NOTE — ED PROVIDER NOTES
"Emergency Department Midlevel Supervisory Note     I personally saw the patient and performed a substantive portion of the visit including all aspects of the medical decision making.    ED Course:  11:15 PM Lesly Bush PA-C staffed patient with me. I agree with their assessment and plan of management, and I will see the patient.  12:38 AM I met with the patient to introduce myself, gather additional history, perform my initial exam, and discuss the plan.     Brief HPI:     Dolores Box is a 28 year old female who presents for evaluation of pelvic pain.    The patient reports that she is currently 12 weeks pregnant and has been experiencing pelvic pain that began earlier today around 2 PM. No vaginal bleeding or abnormal discharge. Also endorses a headache and intermittent dizziness.    I, Shreya Quintanilla, am serving as a scribe to document services personally performed by Usman Lind DO, based on my observations and the provider's statements to me.   I, Usman Lind DO, attest that Shreya Quintanilla was acting in a scribe capacity, has observed my performance of the services and has documented them in accordance with my direction.    Brief Physical Exam: /81   Pulse 83   Temp 98.1  F (36.7  C) (Oral)   Resp 16   Ht 1.6 m (5' 3\")   Wt 87.1 kg (192 lb)   SpO2 100%   BMI 34.01 kg/m    Constitutional:  Alert, in no acute distress  EYES: Conjunctivae clear  HENT:  Atraumatic  Respiratory:  Respirations even, unlabored, in no acute respiratory distress  Cardiovascular:  Regular rate and rhythm, good peripheral perfusion  GI: Soft, non-distended, generalized tenderness  Musculoskeletal:  Moves all 4 extremities equally, grossly symmetrical strength  Integument: Warm & dry. No appreciable rash, erythema.  Neurologic:  Alert & oriented, speech clear and fluent, no focal deficits noted  Psych: Normal mood and affect       MDM:  Patient is a 28-year-old at 11 weeks pregnancy, here with few hours of " worsening lower abdominal pain.  When she was Zenaida, MRI had been ordered for rule out appendicitis.    Patient denies any vaginal eating or discharge.  No dysuria.  No bowel movement changes.    - Patient's MRI showing colitis/diverticulitis.  Difficult to antibiotic choice considering Flagyl can cause fetal issues as well as Cipro.  Augmentin seems to have a risk for necrotizing enterocolitis/bowel issues near delivery however will need anaerobic coverage for her colitis/diverticulitis.  Otherwise we gave her pain medication through shared decision making regarding narcotics use in pregnancy and will discharge home with outpatient follow-up.       1. Abdominal pain        Labs and Imaging:  Results for orders placed or performed during the hospital encounter of 07/16/24   US Appendix Only    Impression    IMPRESSION:  1.  The appendix is not seen in the right lower quadrant, this is indeterminate.    2.  No secondary findings of acute appendicitis identified such as enlarged lymph nodes or free fluid.       US OB < 14 Weeks Single    Impression    IMPRESSION:   1.  Single living intrauterine gestation at 11 weeks 4 days, EDC 01/31/2025.       MR Appendix wo Contrast    Impression    IMPRESSION:  1. Focal edema is present about short segment of mildly edematous proximal sigmoid colon. The sigmoid colon is otherwise not well-characterized on this study. These findings are nonspecific, but most likely relate to underlying diverticulitis or less   likely focal infectious or inflammatory colitis. Recommend clinical correlation. If the symptoms persist or worsen, follow-up imaging would be useful for reevaluation.   2. A trace amount of nonspecific free fluid in the pelvis.   3. Early intrauterine pregnancy. There is a posterior placenta with inferior margin covering the internal cervical os. Follow-up ultrasound is recommended during the early third trimester to reevaluate the relationship between the placenta and  the   cervix. A trace amount of free fluid in the pelvis.  4. No other cause of acute pain identified in the abdomen or pelvis. The appendix is likely visualized and normal in appearance.   CBC (+ platelets, no diff)   Result Value Ref Range    WBC Count 16.1 (H) 4.0 - 11.0 10e3/uL    RBC Count 4.02 3.80 - 5.20 10e6/uL    Hemoglobin 12.7 11.7 - 15.7 g/dL    Hematocrit 36.0 35.0 - 47.0 %    MCV 90 78 - 100 fL    MCH 31.6 26.5 - 33.0 pg    MCHC 35.3 31.5 - 36.5 g/dL    RDW 11.7 10.0 - 15.0 %    Platelet Count 297 150 - 450 10e3/uL   Basic metabolic panel   Result Value Ref Range    Sodium 134 (L) 135 - 145 mmol/L    Potassium 3.9 3.4 - 5.3 mmol/L    Chloride 101 98 - 107 mmol/L    Carbon Dioxide (CO2) 21 (L) 22 - 29 mmol/L    Anion Gap 12 7 - 15 mmol/L    Urea Nitrogen 9.9 6.0 - 20.0 mg/dL    Creatinine 0.60 0.51 - 0.95 mg/dL    GFR Estimate >90 >60 mL/min/1.73m2    Calcium 9.8 8.8 - 10.4 mg/dL    Glucose 90 70 - 99 mg/dL   UA with Microscopic reflex to Culture    Specimen: Urine, Clean Catch   Result Value Ref Range    Color Urine Colorless Colorless, Straw, Light Yellow, Yellow    Appearance Urine Clear Clear    Glucose Urine Negative Negative mg/dL    Bilirubin Urine Negative Negative    Ketones Urine Trace (A) Negative mg/dL    Specific Gravity Urine 1.008 1.001 - 1.030    Blood Urine Negative Negative    pH Urine 6.0 5.0 - 7.0    Protein Albumin Urine Negative Negative mg/dL    Urobilinogen Urine <2.0 <2.0 mg/dL    Nitrite Urine Negative Negative    Leukocyte Esterase Urine Negative Negative    Bacteria Urine Few (A) None Seen /HPF    Mucus Urine Present (A) None Seen /LPF    RBC Urine 1 <=2 /HPF    WBC Urine <1 <=5 /HPF    Squamous Epithelials Urine 2 (H) <=1 /HPF   Hepatic function panel   Result Value Ref Range    Protein Total 7.1 6.4 - 8.3 g/dL    Albumin 4.3 3.5 - 5.2 g/dL    Bilirubin Total 0.3 <=1.2 mg/dL    Alkaline Phosphatase 62 40 - 150 U/L    AST 22 0 - 45 U/L    ALT 22 0 - 50 U/L    Bilirubin Direct  <0.20 0.00 - 0.30 mg/dL   Result Value Ref Range    Lipase 43 13 - 60 U/L   HCG quantitative pregnancy (blood)   Result Value Ref Range    hCG Quantitative 76,679 (H) <5 mIU/mL   Adult Type and Screen   Result Value Ref Range    ABO/RH(D) O POS     Antibody Screen Negative Negative    SPECIMEN EXPIRATION DATE [id number]      I have reviewed the relevant laboratory and radiology studies    Procedures:  I was present for the key portions of procedures documented in midlevel note, see midlevel note for further details.    EKG: Personally reviewed and interpreted as documented below;      Usman Lind DO  [facility] EMERGENCY DEPARTMENT  [facility] [facility] [id number] [phone]     Usman Lind DO  07/17/24 0232

## 2024-07-17 NOTE — ED TRIAGE NOTES
Pt arrives to triage ambulatory w/ boyfriend endorsing severe cramping pain and pressure in pelvis since about 1400. No bleeding, or abnormal discharge.  Pt is 12 weeks pregnant. Headache when got home from work about 30 minutes PTA. Slight headache still at this time. Has been experiencing some dizziness intermittent today. No notable cause. Baby has been well up to this point. Some cramping when first found out pregnant around 4 weeks but nothing like this.

## 2024-11-11 ENCOUNTER — HOSPITAL ENCOUNTER (OUTPATIENT)
Facility: HOSPITAL | Age: 29
End: 2024-11-11
Admitting: FAMILY MEDICINE
Payer: MEDICAID

## 2024-11-11 ENCOUNTER — APPOINTMENT (OUTPATIENT)
Dept: ULTRASOUND IMAGING | Facility: HOSPITAL | Age: 29
End: 2024-11-11
Payer: MEDICAID

## 2024-11-11 ENCOUNTER — HOSPITAL ENCOUNTER (OUTPATIENT)
Facility: HOSPITAL | Age: 29
Discharge: HOME | End: 2024-11-11
Attending: FAMILY MEDICINE | Admitting: FAMILY MEDICINE
Payer: MEDICAID

## 2024-11-11 VITALS — HEIGHT: 63 IN | WEIGHT: 205 LBS | BODY MASS INDEX: 36.32 KG/M2

## 2024-11-11 PROBLEM — Z36.89 ENCOUNTER FOR TRIAGE IN PREGNANT PATIENT: Status: ACTIVE | Noted: 2024-11-11

## 2024-11-11 PROBLEM — N85.8 UTERINE IRRITABILITY: Status: ACTIVE | Noted: 2024-11-11

## 2024-11-11 LAB
ALBUMIN UR-MCNC: NEGATIVE MG/DL
APPEARANCE UR: CLEAR
BACTERIA #/AREA URNS HPF: ABNORMAL /HPF
BILIRUB UR QL STRIP: NEGATIVE
CLUE CELLS: NORMAL
COLOR UR AUTO: ABNORMAL
GLUCOSE UR STRIP-MCNC: NEGATIVE MG/DL
HGB UR QL STRIP: NEGATIVE
KETONES UR STRIP-MCNC: NEGATIVE MG/DL
LEUKOCYTE ESTERASE UR QL STRIP: NEGATIVE
MUCOUS THREADS #/AREA URNS LPF: PRESENT /LPF
NITRATE UR QL: NEGATIVE
PH UR STRIP: 7 [PH] (ref 5–7)
RBC URINE: 1 /HPF
SP GR UR STRIP: 1.02 (ref 1–1.03)
SQUAMOUS EPITHELIAL: 3 /HPF
TRICHOMONAS, WET PREP: NORMAL
UROBILINOGEN UR STRIP-MCNC: <2 MG/DL
WBC URINE: 1 /HPF
WBC'S/HIGH POWER FIELD, WET PREP: NORMAL
YEAST, WET PREP: NORMAL

## 2024-11-11 PROCEDURE — 87210 SMEAR WET MOUNT SALINE/INK: CPT

## 2024-11-11 PROCEDURE — G0463 HOSPITAL OUTPT CLINIC VISIT: HCPCS

## 2024-11-11 PROCEDURE — 76817 TRANSVAGINAL US OBSTETRIC: CPT

## 2024-11-11 PROCEDURE — 87591 N.GONORRHOEAE DNA AMP PROB: CPT | Performed by: FAMILY MEDICINE

## 2024-11-11 PROCEDURE — 81001 URINALYSIS AUTO W/SCOPE: CPT

## 2024-11-11 PROCEDURE — 87491 CHLMYD TRACH DNA AMP PROBE: CPT | Performed by: FAMILY MEDICINE

## 2024-11-11 PROCEDURE — 99204 OFFICE O/P NEW MOD 45 MIN: CPT | Mod: GC

## 2024-11-11 RX ORDER — VITAMIN A, VITAMIN C, VITAMIN D-3, VITAMIN E, VITAMIN B-1, VITAMIN B-2, NIACIN, VITAMIN B-6, CALCIUM, IRON, ZINC, COPPER 4000; 120; 400; 22; 1.84; 3; 20; 10; 1; 12; 200; 27; 25; 2 [IU]/1; MG/1; [IU]/1; MG/1; MG/1; MG/1; MG/1; MG/1; MG/1; UG/1; MG/1; MG/1; MG/1; MG/1
1 TABLET ORAL DAILY
COMMUNITY

## 2024-11-11 RX ORDER — CALCIUM CARBONATE 750 MG/1
300 TABLET, CHEWABLE ORAL 4 TIMES DAILY PRN
COMMUNITY

## 2024-11-11 RX ORDER — LIDOCAINE 40 MG/G
CREAM TOPICAL
Status: DISCONTINUED | OUTPATIENT
Start: 2024-11-11 | End: 2024-11-11 | Stop reason: HOSPADM

## 2024-11-11 RX ORDER — UREA 10 %
50 LOTION (ML) TOPICAL DAILY
COMMUNITY
Start: 2024-06-20

## 2024-11-11 ASSESSMENT — ACTIVITIES OF DAILY LIVING (ADL)
ADLS_ACUITY_SCORE: 0

## 2024-11-11 NOTE — PROGRESS NOTES
OBSTETRICS TRIAGE ASSESSMENT NOTE    Dolores Box is a 28 year old  at 28w0d gestation based on  25 week ultrasound who has presented to maternity care for further evaluation of abdominal pain and decreased fetal movement. Patient notes she got up from sitting on the floor at about 10PM last night when she had an abrupt onset of upper abdominal pain. The pain has been on and off since that time. She does note some tenderness to palpation and pain with movement. No bleeding, leakage of fluids, contractions. She has also not felt her baby move as much this morning, noting that baby is typically quite active. Denies significant headache, dizziness, or vision changes. No falls or accidents. Occasionally has right upper quadrant pain, but states it feels like this is when baby is kicking her ribs.  Pregnancy has been largely uncomplicated. No diabetes or elevated blood pressures. She does note that she has felt baby kick since being here in triage. Has had trouble with acid reflux in the past, but this pain feels different.     Notes some burning with urination lately. No history of frequent UTI. Slight increase in vaginal discharge but not significant.     Was seen in the ED in 2024 for abdominal pain and was diagnosed with diverticulitis; this has since resolved. Denies diarrhea or constipation.     Reports no alcohol, drug, or tobacco use.     PRENATAL CARE  Seen at [facility].         PAST MEDICAL HISTORY  History reviewed. No pertinent past medical history.    PAST SURGICAL HISTORY   Past Surgical History:   Procedure Laterality Date    MOUTH SURGERY Bilateral 2013    bilateral tonsillectomy       MEDICATIONS    Current Facility-Administered Medications:     lidocaine (LMX4) cream, , Topical, Q1H PRN, Dori Valle MD    lidocaine 1 % 0.1-1 mL, 0.1-1 mL, Other, Q1H PRN, Dori Valle MD    sodium chloride (PF) 0.9% PF flush 3 mL, 3 mL, Intracatheter, Q8H, Dori Valle  "MD Jhonny    sodium chloride (PF) 0.9% PF flush 3 mL, 3 mL, Intracatheter, q1 min prn, Dori Valle MD    SOCIAL HISTORY:   Social History     Socioeconomic History    Marital status: Single     Spouse name: Not on file    Number of children: Not on file    Years of education: Not on file    Highest education level: Not on file   Occupational History    Not on file   Tobacco Use    Smoking status: Former     Current packs/day: 0.10     Average packs/day: 0.1 packs/day for 3.8 years (0.4 ttl pk-yrs)     Types: Cigarettes     Start date: 2021    Smokeless tobacco: Never   Substance and Sexual Activity    Alcohol use: No    Drug use: No    Sexual activity: Not on file   Other Topics Concern    Not on file   Social History Narrative    Not on file     Social Drivers of Health     Financial Resource Strain: Not on File (3/8/2021)    Received from [employer]    Financial Resource Strain     Financial Resource Strain: 0   Food Insecurity: Not on File (3/8/2021)    Received from [employer]    Food Insecurity     Food: 0   Transportation Needs: Not on File (3/8/2021)    Received from [employer]    Transportation Needs     Transportation: 0   Physical Activity: Not on File (3/8/2021)    Received from [employer]    Physical Activity     Physical Activity: 0   Stress: Not on File (3/8/2021)    Received from [employer]    Stress     Stress: 0   Social Connections: Not on File (3/8/2021)    Received from [employer]    Social Connections     Social Connections and Isolation: 0   Interpersonal Safety: Not on file   Housing Stability: Not on File (3/8/2021)    Received from [employer]    Housing Stability     Housin       PHYSICAL EXAMINATION   Ht 1.6 m (5' 3\")   Wt 93 kg (205 lb)   LMP 2024 (Exact Date)   BMI 36.31 kg/m    Gen: appears comfortable, no acute distress  CV: regular rate and rhythm, no murmur appreciated  Lungs: clear to ausculation, good air movement throughout  Abdomen: Gravid, mild tenderness to palpation upper abdomen "   Extremities: no lower extremity edema  Cervix: Deferred  FHT: Category  1  tracing; baseline 145 with moderate variability and accelerations  Contractions: Occasional spaced out contractions versus uterine irritability    LAB RESULTS  Personally reviewed.  Recent Results (from the past 24 hours)   UA with Microscopic reflex to Culture    Collection Time: 11/11/24 11:56 AM    Specimen: Urine, Clean Catch   Result Value Ref Range    Color Urine Light Yellow Colorless, Straw, Light Yellow, Yellow    Appearance Urine Clear Clear    Glucose Urine Negative Negative mg/dL    Bilirubin Urine Negative Negative    Ketones Urine Negative Negative mg/dL    Specific Gravity Urine 1.017 1.001 - 1.030    Blood Urine Negative Negative    pH Urine 7.0 5.0 - 7.0    Protein Albumin Urine Negative Negative mg/dL    Urobilinogen Urine <2.0 <2.0 mg/dL    Nitrite Urine Negative Negative    Leukocyte Esterase Urine Negative Negative    Bacteria Urine Few (A) None Seen /HPF    Mucus Urine Present (A) None Seen /LPF    RBC Urine 1 <=2 /HPF    WBC Urine 1 <=5 /HPF    Squamous Epithelials Urine 3 (H) <=1 /HPF   Wet preparation    Collection Time: 11/11/24  1:11 PM    Specimen: Vagina; Swab   Result Value Ref Range    Trichomonas Absent Absent    Yeast Absent Absent    Clue Cells Absent Absent    WBCs/high power field None None       ASSESSMENT:  Dolores Box is a 28 year old year old at 28w0d weeks, presenting with abdominal pain and decreased fetal movement. Unclear etiology of abdominal pain at this time but patient seemingly having some contractions, so would like to rule out causes of uterine irritability and get transvaginal ultrasound to evaluate cervical length. FHT good on monitor here with reactive NST. Mechanism of onset of pain suspicious for soft tissue etiology such as muscle strain. Less likely GERD, cholecystitis, hepatic, or placental in nature. No evidence of preeclampsia. Does not appear to be in early labor.  Patient appears safe to discharge at this time with close follow up.     PLAN:  UA - does not appear infectious  Wet prep - negative   Gonorrhea/chlamydia - pending, will follow up on results when back  NST - reactive  Transvaginal ultrasound - showing closed, normal length cervix  Plan to discharge patient with instruction to follow up with [facility] as planned tomorrow    Lance Gore MD  [facility]       Precepted patient with Dr. Dori Valle who agrees with the plan above.

## 2024-11-11 NOTE — DISCHARGE INSTRUCTIONS
Follow up at your regularly scheduled OB appointment tomorrow 11/12/24 at the [facility].

## 2024-11-11 NOTE — PROGRESS NOTES
1220 RN spoke with Dr. Gore - relayed to MD that U/A came back negative for nitrates and leukocyte esterase, has a few bacteria in urine and some epithelial cells.  Patient reported starting to have contractions every 10 minutes starting at 11:10am. When RN palpating uterus, felt soft to palpation.   Patient reporting that fetus is moving less than normal, but has not eaten yet today.  RN gave food to patient.

## 2024-11-11 NOTE — PROGRESS NOTES
5984 RN spoke with Dr. Gore - relayed to MD that wet prep was negative, and GCC was pending.  Transvaginal ultrasound was normal length cervix, no funneling, not open.  MD ok with patient discharging home and following up tomorrow in clinic.  RN to put in patient's preferred pharmacy in case she needs a prescription.  RN also called Dr. Valle - relayed to MD above information, and that patient reported having 2 contractions in the 30-40 minutes that she was down in ultrasound.  MD to come and see patient and then probably discharge.

## 2024-11-11 NOTE — PROGRESS NOTES
Data: Patient presented to [facility]: 2024  9:57 AM.  Reason for maternal/fetal assessment is decreased fetal movement and abdominal pain. Patient reports abdominal pain above her umbilicus that began yesterday afternoon.  Patient reports this pain coming for  a minute and then going way, reports this pain happening two times in ten minutes this morning.  Patient also notes absent fetal movement this morning. Patient denies leaking of vaginal fluid/rupture of membranes, vaginal bleeding, pelvic pressure, nausea, vomiting, headache, visual disturbances, significant edema.  Patient does state that she has occasional RUQ pain, but notes that as the fetus kicking and her ribs being sore after that. Patient reports fetal movement is absent.  Patient also endorses burning with urination.  Denies diarrhea, constipation, or recent illness.  Patient is a 28w0d .  Prenatal record reviewed. Pregnancy has been uncomplicated per pt statement.    Vital signs wnl. Support person is present.     Action: Verbal consent for EFM. Triage assessment completed.     Response: Patient verbalized agreement with plan. Will contact Dr. Gore with update and further orders.    1044 RN spoke with Dr. Gore - relayed to MD above information.  RN relayed to MD that RN heard audible variable decelerations at 1023 and 1032 down to 80s, but overall FHR tracing appropriate for gestational age.  RN also relayed to MD that patient feeling fetus move now, but movement not back to normal and placenta is posterior.

## 2024-11-11 NOTE — PROGRESS NOTES
1445 Dr. Valle ok with patient discharging home at this time.  Data: Patient assessed in the [facility] for decreased fetal movement. Cervical exam deferred. Membranes intact. Contractions were present, but not consistent - see flowsheet for details. See flowsheets for fetal assessment documentation.     Action: Presumed adequate fetal oxygenation documented. Discharge instructions reviewed. Patient instructed to report change in fetal movement, vaginal leaking of fluid or bleeding, abdominal pain, or any concerns related to the pregnancy to provider/clinic.      Response: Orders to discharge home per Dr. Valle. Patient verbalized understanding of education and agreement with plan.  RN gave AVS and patient verbalized understanding.  Patient has OB appointment tomorrow.  Discharged to home at 1450.

## 2024-11-11 NOTE — PROGRESS NOTES
1150 RN asked Dr. Gore - relayed to MD that FHR tracing has looked good for the past hour - appropriate for gestational age: moderate variability, accelerations 10x10 and 15x15, no decelerations.  MD ok with patient coming off FHR monitor at this time, and RN may leave TOCO on.

## 2024-11-12 LAB
C TRACH DNA SPEC QL PROBE+SIG AMP: NEGATIVE
N GONORRHOEA DNA SPEC QL NAA+PROBE: NEGATIVE

## 2024-11-13 LAB
GLUCOSE (EXTERNAL): 127 MG/DL (ref 70–99)
GLUCOSE SERPL-MCNC: 103 MG/DL (ref 70–99)
GLUCOSE SERPL-MCNC: 115 MG/DL (ref 70–99)
GLUCOSE SERPL-MCNC: 93 MG/DL (ref 70–99)

## 2024-11-13 NOTE — RESULT ENCOUNTER NOTE
Call patient: no sign of any infection when present to hospital ([facility])

## 2025-01-05 ENCOUNTER — HOSPITAL ENCOUNTER (OUTPATIENT)
Facility: HOSPITAL | Age: 30
Discharge: HOME | End: 2025-01-05
Attending: FAMILY MEDICINE | Admitting: FAMILY MEDICINE
Payer: MEDICAID

## 2025-01-05 VITALS
BODY MASS INDEX: 37.74 KG/M2 | HEART RATE: 97 BPM | DIASTOLIC BLOOD PRESSURE: 70 MMHG | TEMPERATURE: 98.5 F | SYSTOLIC BLOOD PRESSURE: 126 MMHG | HEIGHT: 63 IN | OXYGEN SATURATION: 99 % | RESPIRATION RATE: 16 BRPM | WEIGHT: 213 LBS

## 2025-01-05 PROBLEM — Z36.89 ENCOUNTER FOR TRIAGE IN PREGNANT PATIENT: Status: ACTIVE | Noted: 2024-11-11

## 2025-01-05 LAB
ALBUMIN SERPL BCG-MCNC: 3.2 G/DL (ref 3.5–5.2)
ALBUMIN UR-MCNC: NEGATIVE MG/DL
ALP SERPL-CCNC: 160 U/L (ref 40–150)
ALT SERPL W P-5'-P-CCNC: 9 U/L (ref 0–50)
ANION GAP SERPL CALCULATED.3IONS-SCNC: 12 MMOL/L (ref 7–15)
APPEARANCE UR: CLEAR
AST SERPL W P-5'-P-CCNC: 17 U/L (ref 0–45)
BILIRUB SERPL-MCNC: 0.2 MG/DL
BILIRUB UR QL STRIP: NEGATIVE
BUN SERPL-MCNC: 15.8 MG/DL (ref 6–20)
CALCIUM SERPL-MCNC: 9.4 MG/DL (ref 8.8–10.4)
CHLORIDE SERPL-SCNC: 106 MMOL/L (ref 98–107)
COLOR UR AUTO: COLORLESS
CREAT SERPL-MCNC: 0.74 MG/DL (ref 0.51–0.95)
EGFRCR SERPLBLD CKD-EPI 2021: >90 ML/MIN/1.73M2
ERYTHROCYTE [DISTWIDTH] IN BLOOD BY AUTOMATED COUNT: 12 % (ref 10–15)
GLUCOSE SERPL-MCNC: 90 MG/DL (ref 70–99)
GLUCOSE UR STRIP-MCNC: NEGATIVE MG/DL
HCO3 SERPL-SCNC: 18 MMOL/L (ref 22–29)
HCT VFR BLD AUTO: 34.3 % (ref 35–47)
HGB BLD-MCNC: 11.6 G/DL (ref 11.7–15.7)
HGB UR QL STRIP: NEGATIVE
HOLD SPECIMEN: NORMAL
KETONES UR STRIP-MCNC: NEGATIVE MG/DL
LEUKOCYTE ESTERASE UR QL STRIP: NEGATIVE
MAGNESIUM SERPL-MCNC: 1.4 MG/DL (ref 1.7–2.3)
MCH RBC QN AUTO: 28.5 PG (ref 26.5–33)
MCHC RBC AUTO-ENTMCNC: 33.8 G/DL (ref 31.5–36.5)
MCV RBC AUTO: 84 FL (ref 78–100)
NITRATE UR QL: NEGATIVE
PH UR STRIP: 6.5 [PH] (ref 5–7)
PLATELET # BLD AUTO: 273 10E3/UL (ref 150–450)
POTASSIUM SERPL-SCNC: 4 MMOL/L (ref 3.4–5.3)
PROT SERPL-MCNC: 6 G/DL (ref 6.4–8.3)
RBC # BLD AUTO: 4.07 10E6/UL (ref 3.8–5.2)
SODIUM SERPL-SCNC: 136 MMOL/L (ref 135–145)
SP GR UR STRIP: 1.01 (ref 1–1.03)
UROBILINOGEN UR STRIP-MCNC: <2 MG/DL
WBC # BLD AUTO: 11.5 10E3/UL (ref 4–11)

## 2025-01-05 PROCEDURE — 99213 OFFICE O/P EST LOW 20 MIN: CPT | Mod: GE

## 2025-01-05 PROCEDURE — 81003 URINALYSIS AUTO W/O SCOPE: CPT

## 2025-01-05 PROCEDURE — 36415 COLL VENOUS BLD VENIPUNCTURE: CPT

## 2025-01-05 PROCEDURE — 85014 HEMATOCRIT: CPT

## 2025-01-05 PROCEDURE — 83735 ASSAY OF MAGNESIUM: CPT

## 2025-01-05 PROCEDURE — 80053 COMPREHEN METABOLIC PANEL: CPT

## 2025-01-05 PROCEDURE — 82435 ASSAY OF BLOOD CHLORIDE: CPT

## 2025-01-05 RX ORDER — LIDOCAINE 40 MG/G
CREAM TOPICAL
Status: DISCONTINUED | OUTPATIENT
Start: 2025-01-05 | End: 2025-01-06 | Stop reason: HOSPADM

## 2025-01-05 RX ORDER — OMEGA-3/DHA/EPA/FISH OIL 60 MG-90MG
CAPSULE ORAL
COMMUNITY

## 2025-01-05 ASSESSMENT — ACTIVITIES OF DAILY LIVING (ADL)
ADLS_ACUITY_SCORE: 41

## 2025-01-06 NOTE — PROGRESS NOTES
OBSTETRICS TRIAGE ASSESSMENT NOTE    Dolores Box is a 29 year old  at 35w6d gestation based on  25 week ultrasound  who has presented to maternity care for further evaluation of decreased fetal movement and numbness and tingling of the hands and feet. No bleeding, leakage of fluids, contractions. Baby is moving normally.    Of note, patient presented with similar symptoms 2024, similar presentation. Since then, pregnancy has been uncomplicated.    Regarding fetal movement, seems as though fetal movement has been decreased since this morning (not really moving, only moving once or twice every few hours). Has since felt baby move since she arrived to L&D, not feeling much cramping outside of normal contraction discomfort. Denies fluid loss, bleeding, discharge, dysuria, or irritation. Has been doing well from fluid intake point of view.    Of note, does have ongoing numbness and tingling of the hands. States this is a bit worse than usual, but has been told by her PCP that this is normal and wears off. Worse with compression for long periods of time (such as sleep). No headache, dizziness, vision changes, no loss of function, no SOB, no chest palpitations. Somewhat anxious but otherwise stable. BP has been mildly elevated but otherwise is not currently being treated.    Occasional burning with urination. No recent intercourse. No worsening of normal vaginal discharge.    [place] recently? None       PRENATAL CARE  Seen by [facility]       PAST MEDICAL HISTORY  History reviewed. No pertinent past medical history.    PAST SURGICAL HISTORY   Past Surgical History:   Procedure Laterality Date    MOUTH SURGERY Bilateral 2013    bilateral tonsillectomy       MEDICATIONS    Current Facility-Administered Medications:     lidocaine (LMX4) cream, , Topical, Q1H PRN, Dori Valle MD    lidocaine 1 % 0.1-1 mL, 0.1-1 mL, Other, Q1H PRN, Dori Valle MD    sodium chloride (PF)  "0.9% PF flush 3 mL, 3 mL, Intracatheter, Q8H, Dori Valle MD    sodium chloride (PF) 0.9% PF flush 3 mL, 3 mL, Intracatheter, q1 min prn, Dori Valle MD    SOCIAL HISTORY:   Social History     Socioeconomic History    Marital status: Single     Spouse name: Not on file    Number of children: Not on file    Years of education: Not on file    Highest education level: Not on file   Occupational History    Not on file   Tobacco Use    Smoking status: Former     Current packs/day: 0.10     Average packs/day: 0.1 packs/day for 3.9 years (0.4 ttl pk-yrs)     Types: Cigarettes     Start date: 2021    Smokeless tobacco: Never   Substance and Sexual Activity    Alcohol use: No    Drug use: No    Sexual activity: Not on file   Other Topics Concern    Not on file   Social History Narrative    Not on file     Social Drivers of Health     Financial Resource Strain: Not on File (3/8/2021)    Received from [employer]    Financial Resource Strain     Financial Resource Strain: 0   Food Insecurity: Not on File (3/8/2021)    Received from [employer]    Food Insecurity     Food: 0   Transportation Needs: Not on File (3/8/2021)    Received from [employer]    Transportation Needs     Transportation: 0   Physical Activity: Not on File (3/8/2021)    Received from [employer]    Physical Activity     Physical Activity: 0   Stress: Not on File (3/8/2021)    Received from [employer]    Stress     Stress: 0   Social Connections: Not on File (3/8/2021)    Received from [employer]    Social Connections     Social Connections and Isolation: 0   Interpersonal Safety: Not on file   Housing Stability: Not on File (3/8/2021)    Received from [employer]    Housing Stability     Housin       PHYSICAL EXAMINATION   Ht 1.6 m (5' 3\")   Wt 96.6 kg (213 lb)   LMP 2024 (Exact Date)   BMI 37.73 kg/m    Gen: appears comfortable, no acute distress  CV: regular rate and rhythm, no murmur appreciated  Lungs: clear to ausculation, good air movement " throughout  Abdomen: Gravid, non-tender fundus  Extremities: no lower extremity edema  Cervix: 0/20%/high   Membranes: intact  FHT: Category 1 tracing; baseline 140's with moderate variability and accelerations, no decelerations  Contractions: irregular, every 10 minutes    LAB RESULTS  Personally reviewed.  No results found for this or any previous visit (from the past 24 hours).    ASSESSMENT:  Dolores Box is a 29 year old year old at 35w6d weeks, presenting with decreased fetal movement and numbness and tingling of the hands consistent with anxiety.     Decreased fetal movement  - CMP, CBC only notable for elevated alk phos, recommend close follow-up  - Mg wnl  - UA bland  - feeling improved after PO intake  - NST reassuring, adequate fetal movement at this time  - no clinical signs of rupture at this time, cervix closed, unlikely to be active labor at this time  - recommend close monitoring    Numbness and tingling  Elevated BP  Anxiety  - BP mildly elevated ranging between 120-140's/70's-80's. Somewhat anxious when drawing BP  - UA bland as above  - likely due to decreased activity levels over last few days, increase in anxiety  - recommend close follow-up with PCP, close monitoring of BP  - remain abstinent from any substances or exposure      Precepted patient with Dr. Dori Valle who agrees with the plan above.      Jasmyn Meehan MD, PGY-3  [facility]  Pager: [phone]

## 2025-01-06 NOTE — PROGRESS NOTES
Data: Patient presented to [facility]: 2025  6:44 PM.  Reason for maternal/fetal assessment is decreased fetal movement. Patient reports numbness in both arms that started today, she has had numbness in her hands this pregnancy. Pt states she is having a headache that she rates a 3/10. Pt reports baby hasn't been as active since yesterday . Patient denies leaking of vaginal fluid/rupture of membranes, vaginal bleeding, pelvic pressure, vomiting, visual disturbances, epigastric or RUQ pain, significant edema. Patient reports fetal movement is decreased. Patient is a 35w6d .  Prenatal record reviewed. Pregnancy has been uncomplicated.    Vital signs wnl. Support person is present.     Action: Verbal consent for EFM. Triage assessment completed.     Response: Patient verbalized agreement with plan. Will contact Dr. Meehan with update and further orders. Orders from Dr. Meehan to check pt's cervix, continue serial blood pressures, and continue EFM.

## 2025-01-10 LAB — GROUP B STREPTOCOCCUS (EXTERNAL): NEGATIVE

## 2025-01-13 ENCOUNTER — DOCUMENTATION ONLY (OUTPATIENT)
Dept: FAMILY MEDICINE | Facility: CLINIC | Age: 30
End: 2025-01-13
Payer: MEDICAID

## 2025-01-16 ENCOUNTER — OFFICE VISIT (OUTPATIENT)
Dept: FAMILY MEDICINE | Facility: CLINIC | Age: 30
End: 2025-01-16
Payer: MEDICAID

## 2025-01-16 VITALS
SYSTOLIC BLOOD PRESSURE: 125 MMHG | HEIGHT: 63 IN | TEMPERATURE: 98 F | HEART RATE: 83 BPM | BODY MASS INDEX: 39.16 KG/M2 | RESPIRATION RATE: 22 BRPM | OXYGEN SATURATION: 98 % | WEIGHT: 221 LBS | DIASTOLIC BLOOD PRESSURE: 88 MMHG

## 2025-01-16 DIAGNOSIS — Z11.4 SCREENING FOR HIV (HUMAN IMMUNODEFICIENCY VIRUS): Primary | ICD-10-CM

## 2025-01-16 DIAGNOSIS — Z11.59 NEED FOR HEPATITIS C SCREENING TEST: ICD-10-CM

## 2025-01-16 DIAGNOSIS — Z34.03 ENCOUNTER FOR SUPERVISION OF NORMAL FIRST PREGNANCY IN THIRD TRIMESTER: ICD-10-CM

## 2025-01-16 NOTE — PATIENT INSTRUCTIONS
If you have a medical emergency, please call 911.    When to call or come in to the hospital  If you notice a decrease in your baby's movement.   If your begin to experience contractions that are 5 minutes or less apart and lasting for over 45 seconds, or if contractions are becoming more painful.  If you have any bleeding or leakage of fluids.   If you have a headache not resolved with tylenol, right upper abdominal pain, or sudden onset of swelling.  You know your body best. Never hesitate to call or go to the hospital if something doesn't feel right!    [facility]  [address] [place], [place] [id number] [phone]    Preparing for the hospital  You may be feeling anxious as your child s birth approaches. This is normal. To give yourself some peace of mind, pack a bag 3-4 weeks before your due date. Here is a list of things to remember:  Personal care items like toothbrush, hair brush, lip balm, lotion, shampoo, glasses, contacts  Nightgown, bathrobe, slippers  Several pairs of underwear  Comfortable clothes for you to wear home  Cell phone and   Insurance information and any other paperwork needed for your hospital stay  Clothes for baby to wear home  An infant, rear-facing car seat for bringing home your baby (this is required by law)      Prepare your body for labor/optimal positioning of baby   -Go for a walk most days of the week (slowly build up if this is new!)     Try these exercises every day (You can do this whole series a few times per day)     Neck Rolls- 3-5 in each direction  2.  Cat Cow- 5-10 repetitions     3. Windmills- Hold each side for 2-3 seconds, 5 to each side        4. Calf Stretch- hold for 10-30 seconds per leg, repeat 2-3 per side        5. Supported Squat- Hold on to something sturdy, spend 10 seconds-1 minute holding at the bottom.  Repeat 3-5 times.

## 2025-01-16 NOTE — PROGRESS NOTES
"Dolores Box is a 29 year old  female who presents to clinic for a follow up OB visit. She is currently 37w3d with an estimated date of delivery of Feb 3, 2025 via 25w US.   Baby has been moving consistently    Previous concerns this pregnancy   Has been following at [facility]  Passed GTT  No complications  Got all the labs done there      New concerns today:     Feeling some cramping lower belly, last 1 minute, happening 1-2X/hr at night  Some swelling throughout day, sometimes numbness in hands    OB ROS   Headache - No  Chest pain - No  Shortness of breath - No  Abdominal pain/contractions - Feeling some cramping lower belly, last 1 minute, happening 1-2X/hr at night  Vaginal bleeding - No  Vaginal discharge - no big gushes, some clear discharge last night on underwear  Leg swelling - Yes:       Physical exam:  /88   Pulse 83   Temp 98  F (36.7  C)   Resp 22   Ht 1.6 m (5' 3\")   Wt 100.2 kg (221 lb)   LMP 2024 (Exact Date)   SpO2 98%   BMI 39.15 kg/m      General: alert, female in no acute distress  Abdomen: gravid uterus appropriate for gestation age at 37 cm above pubic symphysis, non-tender, FHTs 143, Ultrasound reveals vertex positioning  Gyn: unable to perform adequate cervical exam, not obviously dilated or advanced station, minimal discharge  Extremities: no edema    Assessment/Plan:  Dolores was seen today for prenatal care.    Diagnoses and all orders for this visit:    Encounter for supervision of normal first pregnancy in third trimester  Declined Flu vaccine today, did reportedly get a few vaccines during this pregnancy, unable to see them in chart.      Fundal heights is appropriate  GBS reported done at [facility] and was negative.   Reviewed signs/symptoms of labor and when to present to the hospital.     Follow up in 1 week for routine prenatal visit, sooner if labor symptoms.       Josh Lott MD     1st baby  No epidural, maybe unmedicated or " valerie  Father of baby Jeffry will be at hospital  Plans to go to [facility] barrier method for post-partum contraception

## 2025-01-17 ENCOUNTER — TRANSFERRED RECORDS (OUTPATIENT)
Dept: HEALTH INFORMATION MANAGEMENT | Facility: CLINIC | Age: 30
End: 2025-01-17
Payer: MEDICAID

## 2025-01-18 ENCOUNTER — HOSPITAL ENCOUNTER (INPATIENT)
Facility: HOSPITAL | Age: 30
LOS: 4 days | Discharge: HOME | End: 2025-01-22
Attending: FAMILY MEDICINE | Admitting: STUDENT IN AN ORGANIZED HEALTH CARE EDUCATION/TRAINING PROGRAM
Payer: MEDICAID

## 2025-01-18 ENCOUNTER — APPOINTMENT (OUTPATIENT)
Dept: ULTRASOUND IMAGING | Facility: HOSPITAL | Age: 30
End: 2025-01-18
Payer: MEDICAID

## 2025-01-18 LAB
ABO + RH BLD: NORMAL
ALBUMIN SERPL BCG-MCNC: 3.1 G/DL (ref 3.5–5.2)
ALBUMIN SERPL BCG-MCNC: 3.3 G/DL (ref 3.5–5.2)
ALP SERPL-CCNC: 170 U/L (ref 40–150)
ALP SERPL-CCNC: 174 U/L (ref 40–150)
ALT SERPL W P-5'-P-CCNC: 9 U/L (ref 0–50)
ALT SERPL W P-5'-P-CCNC: 9 U/L (ref 0–50)
ANION GAP SERPL CALCULATED.3IONS-SCNC: 10 MMOL/L (ref 7–15)
ANION GAP SERPL CALCULATED.3IONS-SCNC: 11 MMOL/L (ref 7–15)
AST SERPL W P-5'-P-CCNC: 14 U/L (ref 0–45)
AST SERPL W P-5'-P-CCNC: 17 U/L (ref 0–45)
BILIRUB SERPL-MCNC: 0.2 MG/DL
BILIRUB SERPL-MCNC: 0.2 MG/DL
BLD GP AB SCN SERPL QL: NEGATIVE
BUN SERPL-MCNC: 10.9 MG/DL (ref 6–20)
BUN SERPL-MCNC: 11.7 MG/DL (ref 6–20)
CALCIUM SERPL-MCNC: 10.3 MG/DL (ref 8.8–10.4)
CALCIUM SERPL-MCNC: 9.6 MG/DL (ref 8.8–10.4)
CHLORIDE SERPL-SCNC: 103 MMOL/L (ref 98–107)
CHLORIDE SERPL-SCNC: 105 MMOL/L (ref 98–107)
CREAT SERPL-MCNC: 0.64 MG/DL (ref 0.51–0.95)
CREAT SERPL-MCNC: 0.67 MG/DL (ref 0.51–0.95)
CRYSTALS AMN MICRO: ABNORMAL
EGFRCR SERPLBLD CKD-EPI 2021: >90 ML/MIN/1.73M2
EGFRCR SERPLBLD CKD-EPI 2021: >90 ML/MIN/1.73M2
ERYTHROCYTE [DISTWIDTH] IN BLOOD BY AUTOMATED COUNT: 12.5 % (ref 10–15)
ERYTHROCYTE [DISTWIDTH] IN BLOOD BY AUTOMATED COUNT: 12.6 % (ref 10–15)
GLUCOSE SERPL-MCNC: 100 MG/DL (ref 70–99)
GLUCOSE SERPL-MCNC: 102 MG/DL (ref 70–99)
HCO3 SERPL-SCNC: 18 MMOL/L (ref 22–29)
HCO3 SERPL-SCNC: 21 MMOL/L (ref 22–29)
HCT VFR BLD AUTO: 33.1 % (ref 35–47)
HCT VFR BLD AUTO: 33.4 % (ref 35–47)
HGB BLD-MCNC: 11.3 G/DL (ref 11.7–15.7)
HGB BLD-MCNC: 11.4 G/DL (ref 11.7–15.7)
HOLD SPECIMEN: NORMAL
HOLD SPECIMEN: NORMAL
MAGNESIUM SERPL-MCNC: 1.5 MG/DL (ref 1.7–2.3)
MCH RBC QN AUTO: 28.5 PG (ref 26.5–33)
MCH RBC QN AUTO: 28.7 PG (ref 26.5–33)
MCHC RBC AUTO-ENTMCNC: 33.8 G/DL (ref 31.5–36.5)
MCHC RBC AUTO-ENTMCNC: 34.4 G/DL (ref 31.5–36.5)
MCV RBC AUTO: 83 FL (ref 78–100)
MCV RBC AUTO: 84 FL (ref 78–100)
PLATELET # BLD AUTO: 245 10E3/UL (ref 150–450)
PLATELET # BLD AUTO: 247 10E3/UL (ref 150–450)
POTASSIUM SERPL-SCNC: 3.9 MMOL/L (ref 3.4–5.3)
POTASSIUM SERPL-SCNC: 4.1 MMOL/L (ref 3.4–5.3)
PROT SERPL-MCNC: 5.8 G/DL (ref 6.4–8.3)
PROT SERPL-MCNC: 6 G/DL (ref 6.4–8.3)
RBC # BLD AUTO: 3.97 10E6/UL (ref 3.8–5.2)
RBC # BLD AUTO: 3.97 10E6/UL (ref 3.8–5.2)
SODIUM SERPL-SCNC: 134 MMOL/L (ref 135–145)
SODIUM SERPL-SCNC: 134 MMOL/L (ref 135–145)
SPECIMEN EXP DATE BLD: NORMAL
WBC # BLD AUTO: 10.5 10E3/UL (ref 4–11)
WBC # BLD AUTO: 13.6 10E3/UL (ref 4–11)

## 2025-01-18 PROCEDURE — 76815 OB US LIMITED FETUS(S): CPT

## 2025-01-18 PROCEDURE — 85027 COMPLETE CBC AUTOMATED: CPT

## 2025-01-18 PROCEDURE — 258N000003 HC RX IP 258 OP 636

## 2025-01-18 PROCEDURE — 83735 ASSAY OF MAGNESIUM: CPT

## 2025-01-18 PROCEDURE — 84155 ASSAY OF PROTEIN SERUM: CPT

## 2025-01-18 PROCEDURE — 85014 HEMATOCRIT: CPT

## 2025-01-18 PROCEDURE — 84460 ALANINE AMINO (ALT) (SGPT): CPT

## 2025-01-18 PROCEDURE — 86780 TREPONEMA PALLIDUM: CPT

## 2025-01-18 PROCEDURE — 36415 COLL VENOUS BLD VENIPUNCTURE: CPT

## 2025-01-18 PROCEDURE — 250N000009 HC RX 250

## 2025-01-18 PROCEDURE — 99222 1ST HOSP IP/OBS MODERATE 55: CPT | Mod: AI | Performed by: STUDENT IN AN ORGANIZED HEALTH CARE EDUCATION/TRAINING PROGRAM

## 2025-01-18 PROCEDURE — 120N000013 HC R&B IMCU

## 2025-01-18 PROCEDURE — 86900 BLOOD TYPING SEROLOGIC ABO: CPT

## 2025-01-18 PROCEDURE — 250N000013 HC RX MED GY IP 250 OP 250 PS 637

## 2025-01-18 PROCEDURE — 84075 ASSAY ALKALINE PHOSPHATASE: CPT

## 2025-01-18 PROCEDURE — 85041 AUTOMATED RBC COUNT: CPT

## 2025-01-18 PROCEDURE — 250N000013 HC RX MED GY IP 250 OP 250 PS 637: Performed by: STUDENT IN AN ORGANIZED HEALTH CARE EDUCATION/TRAINING PROGRAM

## 2025-01-18 PROCEDURE — 86850 RBC ANTIBODY SCREEN: CPT

## 2025-01-18 RX ORDER — KETOROLAC TROMETHAMINE 30 MG/ML
30 INJECTION, SOLUTION INTRAMUSCULAR; INTRAVENOUS
Status: COMPLETED | OUTPATIENT
Start: 2025-01-18 | End: 2025-01-19

## 2025-01-18 RX ORDER — MISOPROSTOL 100 UG/1
25 TABLET ORAL
Status: DISCONTINUED | OUTPATIENT
Start: 2025-01-18 | End: 2025-01-19 | Stop reason: HOSPADM

## 2025-01-18 RX ORDER — OXYTOCIN/0.9 % SODIUM CHLORIDE 30/500 ML
100-340 PLASTIC BAG, INJECTION (ML) INTRAVENOUS CONTINUOUS PRN
Status: DISCONTINUED | OUTPATIENT
Start: 2025-01-18 | End: 2025-01-22 | Stop reason: HOSPADM

## 2025-01-18 RX ORDER — HYDRALAZINE HYDROCHLORIDE 20 MG/ML
10 INJECTION INTRAMUSCULAR; INTRAVENOUS
Status: DISCONTINUED | OUTPATIENT
Start: 2025-01-18 | End: 2025-01-22 | Stop reason: HOSPADM

## 2025-01-18 RX ORDER — LOPERAMIDE HYDROCHLORIDE 2 MG/1
4 CAPSULE ORAL
Status: DISCONTINUED | OUTPATIENT
Start: 2025-01-18 | End: 2025-01-19 | Stop reason: HOSPADM

## 2025-01-18 RX ORDER — OXYTOCIN 10 [USP'U]/ML
10 INJECTION, SOLUTION INTRAMUSCULAR; INTRAVENOUS
Status: DISCONTINUED | OUTPATIENT
Start: 2025-01-18 | End: 2025-01-19 | Stop reason: HOSPADM

## 2025-01-18 RX ORDER — PROCHLORPERAZINE MALEATE 10 MG
10 TABLET ORAL EVERY 6 HOURS PRN
Status: DISCONTINUED | OUTPATIENT
Start: 2025-01-18 | End: 2025-01-19 | Stop reason: HOSPADM

## 2025-01-18 RX ORDER — LIDOCAINE 40 MG/G
CREAM TOPICAL
Status: DISCONTINUED | OUTPATIENT
Start: 2025-01-18 | End: 2025-01-22 | Stop reason: HOSPADM

## 2025-01-18 RX ORDER — METOCLOPRAMIDE HYDROCHLORIDE 5 MG/ML
10 INJECTION INTRAMUSCULAR; INTRAVENOUS EVERY 6 HOURS PRN
Status: DISCONTINUED | OUTPATIENT
Start: 2025-01-18 | End: 2025-01-19 | Stop reason: HOSPADM

## 2025-01-18 RX ORDER — NALOXONE HYDROCHLORIDE 0.4 MG/ML
0.2 INJECTION, SOLUTION INTRAMUSCULAR; INTRAVENOUS; SUBCUTANEOUS
Status: DISCONTINUED | OUTPATIENT
Start: 2025-01-18 | End: 2025-01-19 | Stop reason: HOSPADM

## 2025-01-18 RX ORDER — LABETALOL HYDROCHLORIDE 5 MG/ML
20-80 INJECTION, SOLUTION INTRAVENOUS EVERY 10 MIN PRN
Status: DISCONTINUED | OUTPATIENT
Start: 2025-01-18 | End: 2025-01-22 | Stop reason: HOSPADM

## 2025-01-18 RX ORDER — OXYTOCIN/0.9 % SODIUM CHLORIDE 30/500 ML
1-24 PLASTIC BAG, INJECTION (ML) INTRAVENOUS CONTINUOUS
Status: DISCONTINUED | OUTPATIENT
Start: 2025-01-18 | End: 2025-01-19 | Stop reason: HOSPADM

## 2025-01-18 RX ORDER — LOPERAMIDE HYDROCHLORIDE 2 MG/1
2 CAPSULE ORAL
Status: DISCONTINUED | OUTPATIENT
Start: 2025-01-18 | End: 2025-01-19 | Stop reason: HOSPADM

## 2025-01-18 RX ORDER — CARBOPROST TROMETHAMINE 250 UG/ML
250 INJECTION, SOLUTION INTRAMUSCULAR
Status: DISCONTINUED | OUTPATIENT
Start: 2025-01-18 | End: 2025-01-19 | Stop reason: HOSPADM

## 2025-01-18 RX ORDER — LIDOCAINE 40 MG/G
CREAM TOPICAL
Status: DISCONTINUED | OUTPATIENT
Start: 2025-01-18 | End: 2025-01-18 | Stop reason: HOSPADM

## 2025-01-18 RX ORDER — NALOXONE HYDROCHLORIDE 0.4 MG/ML
0.4 INJECTION, SOLUTION INTRAMUSCULAR; INTRAVENOUS; SUBCUTANEOUS
Status: DISCONTINUED | OUTPATIENT
Start: 2025-01-18 | End: 2025-01-19 | Stop reason: HOSPADM

## 2025-01-18 RX ORDER — MISOPROSTOL 200 UG/1
800 TABLET ORAL
Status: DISCONTINUED | OUTPATIENT
Start: 2025-01-18 | End: 2025-01-19 | Stop reason: HOSPADM

## 2025-01-18 RX ORDER — METOCLOPRAMIDE 10 MG/1
10 TABLET ORAL EVERY 6 HOURS PRN
Status: DISCONTINUED | OUTPATIENT
Start: 2025-01-18 | End: 2025-01-19 | Stop reason: HOSPADM

## 2025-01-18 RX ORDER — OXYTOCIN 10 [USP'U]/ML
10 INJECTION, SOLUTION INTRAMUSCULAR; INTRAVENOUS
Status: DISCONTINUED | OUTPATIENT
Start: 2025-01-18 | End: 2025-01-22 | Stop reason: HOSPADM

## 2025-01-18 RX ORDER — SODIUM CHLORIDE, SODIUM LACTATE, POTASSIUM CHLORIDE, CALCIUM CHLORIDE 600; 310; 30; 20 MG/100ML; MG/100ML; MG/100ML; MG/100ML
INJECTION, SOLUTION INTRAVENOUS CONTINUOUS
Status: DISCONTINUED | OUTPATIENT
Start: 2025-01-18 | End: 2025-01-22 | Stop reason: HOSPADM

## 2025-01-18 RX ORDER — LIDOCAINE 40 MG/G
CREAM TOPICAL
Status: DISCONTINUED | OUTPATIENT
Start: 2025-01-18 | End: 2025-01-19 | Stop reason: HOSPADM

## 2025-01-18 RX ORDER — IBUPROFEN 800 MG/1
800 TABLET, FILM COATED ORAL
Status: COMPLETED | OUTPATIENT
Start: 2025-01-18 | End: 2025-01-19

## 2025-01-18 RX ORDER — ONDANSETRON 2 MG/ML
4 INJECTION INTRAMUSCULAR; INTRAVENOUS EVERY 6 HOURS PRN
Status: DISCONTINUED | OUTPATIENT
Start: 2025-01-18 | End: 2025-01-19 | Stop reason: HOSPADM

## 2025-01-18 RX ORDER — TRANEXAMIC ACID 10 MG/ML
1 INJECTION, SOLUTION INTRAVENOUS EVERY 30 MIN PRN
Status: DISCONTINUED | OUTPATIENT
Start: 2025-01-18 | End: 2025-01-19 | Stop reason: HOSPADM

## 2025-01-18 RX ORDER — METHYLERGONOVINE MALEATE 0.2 MG/ML
200 INJECTION INTRAVENOUS
Status: DISCONTINUED | OUTPATIENT
Start: 2025-01-18 | End: 2025-01-19 | Stop reason: HOSPADM

## 2025-01-18 RX ORDER — SODIUM CHLORIDE, SODIUM LACTATE, POTASSIUM CHLORIDE, CALCIUM CHLORIDE 600; 310; 30; 20 MG/100ML; MG/100ML; MG/100ML; MG/100ML
10-125 INJECTION, SOLUTION INTRAVENOUS CONTINUOUS
Status: DISCONTINUED | OUTPATIENT
Start: 2025-01-18 | End: 2025-01-18

## 2025-01-18 RX ORDER — MISOPROSTOL 200 UG/1
400 TABLET ORAL
Status: DISCONTINUED | OUTPATIENT
Start: 2025-01-18 | End: 2025-01-19 | Stop reason: HOSPADM

## 2025-01-18 RX ORDER — OXYTOCIN/0.9 % SODIUM CHLORIDE 30/500 ML
340 PLASTIC BAG, INJECTION (ML) INTRAVENOUS CONTINUOUS PRN
Status: DISCONTINUED | OUTPATIENT
Start: 2025-01-18 | End: 2025-01-19 | Stop reason: HOSPADM

## 2025-01-18 RX ORDER — CALCIUM CARBONATE 500 MG/1
500 TABLET, CHEWABLE ORAL DAILY PRN
Status: DISCONTINUED | OUTPATIENT
Start: 2025-01-18 | End: 2025-01-22 | Stop reason: HOSPADM

## 2025-01-18 RX ORDER — CITRIC ACID/SODIUM CITRATE 334-500MG
30 SOLUTION, ORAL ORAL
Status: DISCONTINUED | OUTPATIENT
Start: 2025-01-18 | End: 2025-01-19 | Stop reason: HOSPADM

## 2025-01-18 RX ORDER — ONDANSETRON 4 MG/1
4 TABLET, ORALLY DISINTEGRATING ORAL EVERY 6 HOURS PRN
Status: DISCONTINUED | OUTPATIENT
Start: 2025-01-18 | End: 2025-01-19 | Stop reason: HOSPADM

## 2025-01-18 RX ADMIN — MISOPROSTOL 25 MCG: 100 TABLET ORAL at 15:38

## 2025-01-18 RX ADMIN — MISOPROSTOL 25 MCG: 100 TABLET ORAL at 13:41

## 2025-01-18 RX ADMIN — CALCIUM CARBONATE (ANTACID) CHEW TAB 500 MG 500 MG: 500 CHEW TAB at 15:40

## 2025-01-18 RX ADMIN — Medication 2 MILLI-UNITS/MIN: at 21:44

## 2025-01-18 RX ADMIN — MISOPROSTOL 25 MCG: 100 TABLET ORAL at 09:16

## 2025-01-18 RX ADMIN — SODIUM CHLORIDE, POTASSIUM CHLORIDE, SODIUM LACTATE AND CALCIUM CHLORIDE: 600; 310; 30; 20 INJECTION, SOLUTION INTRAVENOUS at 04:10

## 2025-01-18 RX ADMIN — CALCIUM CARBONATE (ANTACID) CHEW TAB 500 MG 500 MG: 500 CHEW TAB at 05:37

## 2025-01-18 RX ADMIN — MISOPROSTOL 25 MCG: 100 TABLET ORAL at 11:30

## 2025-01-18 ASSESSMENT — ACTIVITIES OF DAILY LIVING (ADL)
ADLS_ACUITY_SCORE: 18
ADLS_ACUITY_SCORE: 41
ADLS_ACUITY_SCORE: 18
ADLS_ACUITY_SCORE: 18

## 2025-01-18 NOTE — PLAN OF CARE
Goal Outcome Evaluation:       Vital sign assessments stable. Light bleeding noted. Patient manda every 6 minutes. Fetal heart tracing category 1.

## 2025-01-18 NOTE — PROVIDER NOTIFICATION
RN reported last BP to Dr. Diaz as well as EFM is category I, manda every 3-5 minutes. Dolores is starting to get uncomfortable.    Orders for SVE before next Cytotec. RN reported check.     Continue to monitor and update Dr. Diaz as needed, may consider pit in the future if labor does not progress.

## 2025-01-18 NOTE — PROGRESS NOTES
OBSTETRICS TRIAGE ASSESSMENT NOTE    Dolores Box is a 29 year old  at 37w5d gestation based on  25w dating ultrasound who has presented to maternity care for further evaluation of vaginal bleeding, leakage of fluids, lower abdominal pain, contractions. Baby is moving normally. Does endorse headache, sharp belly pain, contractions are soft but reported every 20-30 minutes. Endorses increased swelling to arms/legs. VSS on presentation, baby initially with minimal variability however has improved to cat I tracing s/p IVF resuscitation. Denies trauma, falls, or injury to the abdomen.     Deary recently? no    Vaginal Bleeding  Pregnancy, 37 weeks gestation  -Will obtain transabdominal ultrasound STAT  --Consideration for placenta previa, vasa previa, or placental abruption prior to cervical exam  -bedside U/S demonstrates cephalic presentation  -CBC w/diff  -type and screen  -Will obtain HELLP labs in setting of HA, edema, abdominal pain  -IVF  -ROM+ and ferning - pending  -Wet prep, UA - pending    Addendum 8:21am:  No placenta previa identified on US. No evidence of hemorrhage or abruption.   Cervix closed. Contraction infrequent.   Now with some pink streaking when wiping. No significant ongoing bleed.    Fern and ROM + negative.    Suspect early labor prodrome, but not in active labor.  If rom+ and fern negative, would be safe to discharge with close follow up.       PRENATAL CARE  Seen by Dr. Lott at [facility].     Prev followed with Maria T Maldonado    PAST MEDICAL HISTORY  History reviewed. No pertinent past medical history.    PAST SURGICAL HISTORY   Past Surgical History:   Procedure Laterality Date    MOUTH SURGERY Bilateral 2013    bilateral tonsillectomy       MEDICATIONS    Current Facility-Administered Medications:     lidocaine (LMX4) cream, , Topical, Q1H PRN, Dori Valle MD    lidocaine 1 % 0.1-1 mL, 0.1-1 mL, Other, Q1H PRN, Dori Valle MD    sodium  chloride (PF) 0.9% PF flush 3 mL, 3 mL, Intracatheter, Q8H, Dori Valle MD    sodium chloride (PF) 0.9% PF flush 3 mL, 3 mL, Intracatheter, q1 min prn, Dori Valle MD    SOCIAL HISTORY:   Social History     Socioeconomic History    Marital status: Single     Spouse name: Not on file    Number of children: Not on file    Years of education: Not on file    Highest education level: Not on file   Occupational History    Not on file   Tobacco Use    Smoking status: Former     Current packs/day: 0.10     Average packs/day: 0.1 packs/day for 4.0 years (0.4 ttl pk-yrs)     Types: Cigarettes     Start date: 02/2021     Passive exposure: Never    Smokeless tobacco: Never   Substance and Sexual Activity    Alcohol use: No    Drug use: No    Sexual activity: Yes   Other Topics Concern    Not on file   Social History Narrative    Not on file     Social Drivers of Health     Financial Resource Strain: Not on File (3/8/2021)    Received from [facility]    Financial Resource Strain     Financial Resource Strain: 0   Food Insecurity: Not on File (3/8/2021)    Received from [facility]    Food Insecurity     Food: 0   Transportation Needs: Not on File (3/8/2021)    Received from [facility]    Transportation Needs     Transportation: 0   Physical Activity: Not on File (3/8/2021)    Received from [facility]    Physical Activity     Physical Activity: 0   Stress: Not on File (3/8/2021)    Received from [facility]    Stress     Stress: 0   Social Connections: Not on File (3/8/2021)    Received from [facility]    Social Connections     Social Connections and Isolation: 0   Interpersonal Safety: Low Risk  (1/16/2025)    Interpersonal Safety     Do you feel physically and emotionally safe where you currently live?: Yes     Within the past 12 months, have you been hit, slapped, kicked or otherwise physically hurt by someone?: No     Within the past 12 months, have you been humiliated or emotionally abused in other ways by your partner or ex-partner?: No  "  Housing Stability: Not on File (3/8/2021)    Received from [facility]     Housin       PHYSICAL EXAMINATION   Temp 98.3  F (36.8  C) (Oral)   Ht 1.6 m (5' 3\")   Wt 100.2 kg (221 lb)   LMP 2024 (Exact Date)   BMI 39.15 kg/m    Gen: appears comfortable, no acute distress  CV: regular rate and rhythm, no murmur appreciated  Lungs: clear to ausculation, good air movement throughout  Abdomen: Gravid, non-tender fundus  Extremities: no lower extremity edema  Cervix: Not yet assessed, awaiting U/S results  Membranes: not intact  FHT: Category 1 tracing; baseline 130 with moderate variability and accelerations, no decelerations  Contractions: irregular, every 20-30 minutes    LAB RESULTS  Personally reviewed.  No results found for this or any previous visit (from the past 24 hours).    ASSESSMENT:  Dolores Box is a 29 year old year old at 37w5d weeks in prelabor vs. labor, presenting with vaginal bleeding, infrequent contractions, lower abdominal pain, leakage of fluids suspicious for possible vasa or placenta previa vs. R/out placental abruption, will also obtain HELLP labs in setting of headache, peripheral edema on exam. VSS, BP wnl. No hx gHTN or gDM this pregnancy.  PLAN:  Vaginal Bleeding  Pregnancy, 37 weeks gestation  -Will obtain transabdominal ultrasound STAT  --Consideration for placenta previa, vasa previa, or placental abruption prior to cervical exam  -bedside U/S demonstrates cephalic presentation  -CBC w/diff  -type and screen  -Will obtain HELLP labs in setting of HA, edema, abdominal pain  -IVF  -ROM+ and ferning  Continue fetal heart monitoring, continuous    Haroon Odonnell MD  [facility]         Precepted patient with Dr. Soha Diaz who agrees with the plan above.   "

## 2025-01-18 NOTE — PROGRESS NOTES
Data: [facility] presented to [facility]: 2025  3:33 AM.  Reason for maternal/fetal assessment is vaginal bleeding. Patient reports bright red vaginal bleeding starting at 0200 and intermittent contractions. Patient denies leaking of vaginal fluid/rupture of membranes, nausea, vomiting, headache, visual disturbances, epigastric or RUQ pain, significant edema. Patient reports fetal movement is normal. Patient is a 37w5d .  Prenatal record reviewed. Pregnancy has been uncomplicated.    Vital signs wnl. Support person is present.     Action: Verbal consent for EFM. Triage assessment completed.     Response: Patient verbalized agreement with plan. Will contact OB resident with update and further orders.

## 2025-01-18 NOTE — H&P
OB Admission History and Physical    Date: 2025  Time: 3:09 PM    Admission H&P  Dolores Box,  1995, MRN [id number]    Normal labor and delivery [O80]    PCP: Nadya Cuellar, [phone]   Code status:  Full Code       Extended Emergency Contact Information  Primary Emergency Contact: Cynthia Box  Address: [address] [place] [address] [place]  Home Phone: [phone]  Mobile Phone: [phone]  Relation: Mother  Secondary Emergency Contact: SharondaNick   [facility]  Home Phone: [phone]  Relation: Father       Chief Complaint: <principal problem not specified>       OBSTETRICAL / DATING HISTORY:  Estimated Date of Delivery: Estimated Date of Delivery: Feb 3, 2025  Gestational Age: 37w5d    OB History    Para Term  AB Living   1 0 0 0 0 0   SAB IAB Ectopic Multiple Live Births   0 0 0 0 0      # Outcome Date GA Lbr Donte/2nd Weight Sex Type Anes PTL Lv   1 Current                HPI:    Presents to L&D Patient is a 29 year old  female who at 37 weeks 5 days who presented to labor and delivery after suspected ROM at 2am today with bloody show.  Patient with klaudia fluid leakage in hospital, Fern positive.  Pregnancy uncomplicated.  Transferred to care at [facility] from [facility] on 25.    [facility]  [email or web address]  [email or web address]     Surgical History  She  has a past surgical history that includes Mouth surgery (Bilateral, 2013).       Social History  Reviewed, and she  reports that she has quit smoking. Her smoking use included cigarettes. She started smoking about 3 years ago. She has a 0.4 pack-year smoking history. She has never been exposed to tobacco smoke. She has never used smokeless tobacco. She reports that she does not drink alcohol and does not use drugs.        Family History  Reviewed, and family history is not on file.   Psychosocial Needs  Social History     Social History Narrative    Not on file  "    Additional psychosocial needs reviewed per nursing assessment.       No Known Allergies   Medications Prior to Admission   Medication Sig Dispense Refill Last Dose/Taking    fish oil-omega-3 fatty acids 500 MG capsule Take by mouth.   1/17/2025    Prenatal Vit-Fe Fumarate-FA (PRENATAL MULTIVITAMIN  PLUS IRON) 27-1 MG TABS Take 1 tablet by mouth daily.   1/17/2025    Pyridoxine HCl (VITAMIN B6) 50 MG TABS Take 50 mg by mouth daily.   1/17/2025    calcium carbonate 750 MG CHEW Take 300 mg by mouth 4 times daily as needed (heartburn).   Unknown        Review of Systems:  Pertinent items are noted in HPI.    Physical Exam:  /77   Pulse 71   Temp 97.8  F (36.6  C) (Oral)   Resp 18   Ht 1.6 m (5' 3\")   Wt 100.2 kg (221 lb)   LMP 04/24/2024 (Exact Date)   BMI 39.15 kg/m    OBGyn Exam  Membrane status:   Membrane Status:     Rupture Date:     Fluid color: Amniotic Fluid Color: Pink   Fluid odor: Amniotic Fluid Odor: Normal   Fluid Amount: Amniotic Fluid Amount: Large  Fetal assessment:   Heart Rate baseline classification:     Variability:     Pattern:     FHR Category:    Cervical exam:   Dilation  cm   Effacement  %   Cervical characteristics     Station     Presentation     Uterine activity:   Mode:     Contraction frequency:     Contraction duration: Contraction Duration (seconds): not tracing well   Contraction quality:     Resting Tone Palpated:      Pertinent Labs  Admission on 01/18/2025   Component Date Value Ref Range Status    WBC Count 01/18/2025 10.5  4.0 - 11.0 10e3/uL Final    RBC Count 01/18/2025 3.97  3.80 - 5.20 10e6/uL Final    Hemoglobin 01/18/2025 11.4 (L)  11.7 - 15.7 g/dL Final    Hematocrit 01/18/2025 33.1 (L)  35.0 - 47.0 % Final    MCV 01/18/2025 83  78 - 100 fL Final    MCH 01/18/2025 28.7  26.5 - 33.0 pg Final    MCHC 01/18/2025 34.4  31.5 - 36.5 g/dL Final    RDW 01/18/2025 12.5  10.0 - 15.0 % Final    Platelet Count 01/18/2025 247  150 - 450 10e3/uL Final    Sodium 01/18/2025 " 134 (L)  135 - 145 mmol/L Final    Potassium 01/18/2025 4.1  3.4 - 5.3 mmol/L Final    Carbon Dioxide (CO2) 01/18/2025 18 (L)  22 - 29 mmol/L Final    Anion Gap 01/18/2025 11  7 - 15 mmol/L Final    Urea Nitrogen 01/18/2025 11.7  6.0 - 20.0 mg/dL Final    Creatinine 01/18/2025 0.64  0.51 - 0.95 mg/dL Final    GFR Estimate 01/18/2025 >90  >60 mL/min/1.73m2 Final    eGFR calculated using 2021 CKD-EPI equation.    Calcium 01/18/2025 10.3  8.8 - 10.4 mg/dL Final    Reference intervals for this test were updated on 7/16/2024 to reflect our healthy population more accurately. There may be differences in the flagging of prior results with similar values performed with this method. Those prior results can be interpreted in the context of the updated reference intervals.    Chloride 01/18/2025 105  98 - 107 mmol/L Final    Glucose 01/18/2025 102 (H)  70 - 99 mg/dL Final    Alkaline Phosphatase 01/18/2025 174 (H)  40 - 150 U/L Final    AST 01/18/2025 17  0 - 45 U/L Final    ALT 01/18/2025 9  0 - 50 U/L Final    Protein Total 01/18/2025 6.0 (L)  6.4 - 8.3 g/dL Final    Albumin 01/18/2025 3.3 (L)  3.5 - 5.2 g/dL Final    Bilirubin Total 01/18/2025 0.2  <=1.2 mg/dL Final    Magnesium 01/18/2025 1.5 (L)  1.7 - 2.3 mg/dL Final    ABO/RH(D) 01/18/2025 O POS   Final    Antibody Screen 01/18/2025 Negative  Negative Final    SPECIMEN EXPIRATION DATE 01/18/2025 [id number]   Final    Hold Specimen 01/18/2025 JIC   Final    Hold Specimen 01/18/2025 JI   Final    Fern Crystallization 01/18/2025 Ferning present (A)  No ferning present Final   Documentation Only on 01/13/2025   Component Date Value Ref Range Status    Glucose (External) 11/13/2024 127 (A)  70 - 99 mg/dL Final    1 hour    Glucose 11/13/2024 115 (A)  70 - 99 mg/dL Final    2 hour    Glucose 11/13/2024 103 (A)  70 - 99 mg/dL Final    3 hour    Glucose 11/13/2024 93  70 - 99 mg/dL Final    Fasting    Rubella Antibody IgG (External) 07/03/2024 Non-Immune  Nonreactive Final     <0.90    Hepatitis B Surface Antigen (Exter* 07/03/2024 Nonreactive  Nonreactive Final    ABO (External) 07/03/2024 O   Final    Rh (External) 07/03/2024 POSITIVE   Final   Admission on 01/05/2025, Discharged on 01/05/2025   Component Date Value Ref Range Status    Sodium 01/05/2025 136  135 - 145 mmol/L Final    Potassium 01/05/2025 4.0  3.4 - 5.3 mmol/L Final    Carbon Dioxide (CO2) 01/05/2025 18 (L)  22 - 29 mmol/L Final    Anion Gap 01/05/2025 12  7 - 15 mmol/L Final    Urea Nitrogen 01/05/2025 15.8  6.0 - 20.0 mg/dL Final    Creatinine 01/05/2025 0.74  0.51 - 0.95 mg/dL Final    GFR Estimate 01/05/2025 >90  >60 mL/min/1.73m2 Final    eGFR calculated using 2021 CKD-EPI equation.    Calcium 01/05/2025 9.4  8.8 - 10.4 mg/dL Final    Reference intervals for this test were updated on 7/16/2024 to reflect our healthy population more accurately. There may be differences in the flagging of prior results with similar values performed with this method. Those prior results can be interpreted in the context of the updated reference intervals.    Chloride 01/05/2025 106  98 - 107 mmol/L Final    Glucose 01/05/2025 90  70 - 99 mg/dL Final    Alkaline Phosphatase 01/05/2025 160 (H)  40 - 150 U/L Final    AST 01/05/2025 17  0 - 45 U/L Final    ALT 01/05/2025 9  0 - 50 U/L Final    Protein Total 01/05/2025 6.0 (L)  6.4 - 8.3 g/dL Final    Albumin 01/05/2025 3.2 (L)  3.5 - 5.2 g/dL Final    Bilirubin Total 01/05/2025 0.2  <=1.2 mg/dL Final    WBC Count 01/05/2025 11.5 (H)  4.0 - 11.0 10e3/uL Final    RBC Count 01/05/2025 4.07  3.80 - 5.20 10e6/uL Final    Hemoglobin 01/05/2025 11.6 (L)  11.7 - 15.7 g/dL Final    Hematocrit 01/05/2025 34.3 (L)  35.0 - 47.0 % Final    MCV 01/05/2025 84  78 - 100 fL Final    MCH 01/05/2025 28.5  26.5 - 33.0 pg Final    MCHC 01/05/2025 33.8  31.5 - 36.5 g/dL Final    RDW 01/05/2025 12.0  10.0 - 15.0 % Final    Platelet Count 01/05/2025 273  150 - 450 10e3/uL Final    Magnesium 01/05/2025 1.4 (L)   1.7 - 2.3 mg/dL Final    Color Urine 01/05/2025 Colorless  Colorless, Straw, Light Yellow, Yellow Final    Appearance Urine 01/05/2025 Clear  Clear Final    Glucose Urine 01/05/2025 Negative  Negative mg/dL Final    Bilirubin Urine 01/05/2025 Negative  Negative Final    Ketones Urine 01/05/2025 Negative  Negative mg/dL Final    Specific Gravity Urine 01/05/2025 1.010  1.001 - 1.030 Final    Blood Urine 01/05/2025 Negative  Negative Final    pH Urine 01/05/2025 6.5  5.0 - 7.0 Final    Protein Albumin Urine 01/05/2025 Negative  Negative mg/dL Final    Urobilinogen Urine 01/05/2025 <2.0  <2.0 mg/dL Final    Nitrite Urine 01/05/2025 Negative  Negative Final    Leukocyte Esterase Urine 01/05/2025 Negative  Negative Final    Hold Specimen 01/05/2025 [facility]   Final   Admission on 11/11/2024, Discharged on 11/11/2024   Component Date Value Ref Range Status    Color Urine 11/11/2024 Light Yellow  Colorless, Straw, Light Yellow, Yellow Final    Appearance Urine 11/11/2024 Clear  Clear Final    Glucose Urine 11/11/2024 Negative  Negative mg/dL Final    Bilirubin Urine 11/11/2024 Negative  Negative Final    Ketones Urine 11/11/2024 Negative  Negative mg/dL Final    Specific Gravity Urine 11/11/2024 1.017  1.001 - 1.030 Final    Blood Urine 11/11/2024 Negative  Negative Final    pH Urine 11/11/2024 7.0  5.0 - 7.0 Final    Protein Albumin Urine 11/11/2024 Negative  Negative mg/dL Final    Urobilinogen Urine 11/11/2024 <2.0  <2.0 mg/dL Final    Nitrite Urine 11/11/2024 Negative  Negative Final    Leukocyte Esterase Urine 11/11/2024 Negative  Negative Final    Bacteria Urine 11/11/2024 Few (A)  None Seen /HPF Final    Mucus Urine 11/11/2024 Present (A)  None Seen /LPF Final    RBC Urine 11/11/2024 1  <=2 /HPF Final    WBC Urine 11/11/2024 1  <=5 /HPF Final    Squamous Epithelials Urine 11/11/2024 3 (H)  <=1 /HPF Final    Trichomonas 11/11/2024 Absent  Absent Final    Yeast 11/11/2024 Absent  Absent Final    Clue Cells 11/11/2024  Absent  Absent Final    WBCs/high power field 11/11/2024 None  None Final    Chlamydia Trachomatis 11/11/2024 Negative  Negative Final    Negative for C. trachomatis rRNA by transcription mediated amplification.   A negative result by transcription mediated amplification does not preclude the presence of infection because results are dependent on proper and adequate collection, absence of inhibitors and sufficient rRNA to be detected.    Neisseria gonorrhoeae 11/11/2024 Negative  Negative Final    Negative for N. gonorrhoeae rRNA by transcription mediated amplification. A negative result by transcription mediated amplification does not preclude the presence of C. trachomatis infection because results are dependent on proper and adequate collection, absence of inhibitors and sufficient rRNA to be detected.   Admission on 07/16/2024, Discharged on 07/17/2024   Component Date Value Ref Range Status    WBC Count 07/16/2024 16.1 (H)  4.0 - 11.0 10e3/uL Final    RBC Count 07/16/2024 4.02  3.80 - 5.20 10e6/uL Final    Hemoglobin 07/16/2024 12.7  11.7 - 15.7 g/dL Final    Hematocrit 07/16/2024 36.0  35.0 - 47.0 % Final    MCV 07/16/2024 90  78 - 100 fL Final    MCH 07/16/2024 31.6  26.5 - 33.0 pg Final    MCHC 07/16/2024 35.3  31.5 - 36.5 g/dL Final    RDW 07/16/2024 11.7  10.0 - 15.0 % Final    Platelet Count 07/16/2024 297  150 - 450 10e3/uL Final    Sodium 07/16/2024 134 (L)  135 - 145 mmol/L Final    Potassium 07/16/2024 3.9  3.4 - 5.3 mmol/L Final    Chloride 07/16/2024 101  98 - 107 mmol/L Final    Carbon Dioxide (CO2) 07/16/2024 21 (L)  22 - 29 mmol/L Final    Anion Gap 07/16/2024 12  7 - 15 mmol/L Final    Urea Nitrogen 07/16/2024 9.9  6.0 - 20.0 mg/dL Final    Creatinine 07/16/2024 0.60  0.51 - 0.95 mg/dL Final    GFR Estimate 07/16/2024 >90  >60 mL/min/1.73m2 Final    eGFR calculated using 2021 CKD-EPI equation.    Calcium 07/16/2024 9.8  8.8 - 10.4 mg/dL Final    Reference intervals for this test were updated on  7/16/2024 to reflect our healthy population more accurately. There may be differences in the flagging of prior results with similar values performed with this method. Those prior results can be interpreted in the context of the updated reference intervals.    Glucose 07/16/2024 90  70 - 99 mg/dL Final    Color Urine 07/16/2024 Colorless  Colorless, Straw, Light Yellow, Yellow Final    Appearance Urine 07/16/2024 Clear  Clear Final    Glucose Urine 07/16/2024 Negative  Negative mg/dL Final    Bilirubin Urine 07/16/2024 Negative  Negative Final    Ketones Urine 07/16/2024 Trace (A)  Negative mg/dL Final    Specific Gravity Urine 07/16/2024 1.008  1.001 - 1.030 Final    Blood Urine 07/16/2024 Negative  Negative Final    pH Urine 07/16/2024 6.0  5.0 - 7.0 Final    Protein Albumin Urine 07/16/2024 Negative  Negative mg/dL Final    Urobilinogen Urine 07/16/2024 <2.0  <2.0 mg/dL Final    Nitrite Urine 07/16/2024 Negative  Negative Final    Leukocyte Esterase Urine 07/16/2024 Negative  Negative Final    Bacteria Urine 07/16/2024 Few (A)  None Seen /HPF Final    Mucus Urine 07/16/2024 Present (A)  None Seen /LPF Final    RBC Urine 07/16/2024 1  <=2 /HPF Final    WBC Urine 07/16/2024 <1  <=5 /HPF Final    Squamous Epithelials Urine 07/16/2024 2 (H)  <=1 /HPF Final    Protein Total 07/16/2024 7.1  6.4 - 8.3 g/dL Final    Albumin 07/16/2024 4.3  3.5 - 5.2 g/dL Final    Bilirubin Total 07/16/2024 0.3  <=1.2 mg/dL Final    Alkaline Phosphatase 07/16/2024 62  40 - 150 U/L Final    AST 07/16/2024 22  0 - 45 U/L Final    ALT 07/16/2024 22  0 - 50 U/L Final    Bilirubin Direct 07/16/2024 <0.20  0.00 - 0.30 mg/dL Final    Lipase 07/16/2024 43  13 - 60 U/L Final    hCG Quantitative 07/16/2024 76,679 (H)  <5 mIU/mL Final    Adult: 0-5 mIU/mL for healthy non-pregnant person  Neonates: Should be within normal ranges by 2 days after birth      ABO/RH(D) 07/16/2024 O POS   Final    Antibody Screen 07/16/2024 Negative  Negative Final     "SPECIMEN EXPIRATION DATE 07/16/2024 [id number]   Final   Transferred Records on 07/01/2024   Component Date Value Ref Range Status    PAP Smear - HIM Patient Reported 07/01/2024 Unknown   Final       Impression:  Dolores Box is a 29 year old year old at 37w5d weeks here with PROM. Now with some contractions spontaneously in hospital  OB risk Low risk\"      Plan:  1. Labor considerations:   Needs some cervical ripening at this time.  Will start oral cytotec with addition of Pitocin when able  Limit cervical checks  Blood type: O POS  Anesthesia during labor plan: currently none       Dr. Moni Diaz MD            "

## 2025-01-18 NOTE — PROGRESS NOTES
Preceptor Attestation:   Patient seen, evaluated and discussed with the resident. I have verified the content of the note, which accurately reflects my assessment of the patient and the plan of care.    Supervising Physician:Francis Durand MD    [facility]

## 2025-01-18 NOTE — PROGRESS NOTES
Order for protein random urine collection discussed with patient. Education provided on reason for test and that it would need to be collected via straight catheter because she is ruptured and bleeding. Patient declines at this time.

## 2025-01-18 NOTE — PROGRESS NOTES
Late Entry:    This RN received report and assumed care from Sergei Gonzáles RN.    Pt is comfortable feels some ctns, appears to be grossly rupture. This RN collected and sent a FERN. Vss, afebrile, assessment WNL. Took pt off monitor encouraged to order breakfast.

## 2025-01-18 NOTE — PLAN OF CARE
Goal Outcome Evaluation:      Plan of Care Reviewed With: patient    Overall Patient Progress: improvingOverall Patient Progress: improving    Outcome Evaluation: Dolores is coping well. She feels the contractions and rates them 5 on the pain scale. EFm is mosty category I when tracing        Problem: Labor  Goal: Effective Progression to Delivery  Outcome: Progressing     Limiting SVE due to ROM, but administering Cytotec PO per order      Problem: Labor  Goal: Normal Uterine Contraction Pattern  Outcome: Progressing     Festus every 6 minutes or so, palpates mild      Problem: Labor  Goal: Stable Fetal Wellbeing  Outcome: Progressing     EFM is category I when tracing    Plan for

## 2025-01-19 ENCOUNTER — ANESTHESIA EVENT (OUTPATIENT)
Dept: OBGYN | Facility: HOSPITAL | Age: 30
End: 2025-01-19
Payer: MEDICAID

## 2025-01-19 ENCOUNTER — ANESTHESIA (OUTPATIENT)
Dept: OBGYN | Facility: HOSPITAL | Age: 30
End: 2025-01-19
Payer: MEDICAID

## 2025-01-19 LAB
ALBUMIN MFR UR ELPH: 16.5 MG/DL
APTT PPP: 27 SECONDS (ref 22–38)
APTT PPP: 28 SECONDS (ref 22–38)
APTT PPP: 28 SECONDS (ref 22–38)
CREAT UR-MCNC: 89.8 MG/DL
D DIMER PPP FEU-MCNC: 0.82 UG/ML FEU (ref 0–0.5)
D DIMER PPP FEU-MCNC: 1.2 UG/ML FEU (ref 0–0.5)
D DIMER PPP FEU-MCNC: 2.61 UG/ML FEU (ref 0–0.5)
ERYTHROCYTE [DISTWIDTH] IN BLOOD BY AUTOMATED COUNT: 12.5 % (ref 10–15)
ERYTHROCYTE [DISTWIDTH] IN BLOOD BY AUTOMATED COUNT: 12.6 % (ref 10–15)
ERYTHROCYTE [DISTWIDTH] IN BLOOD BY AUTOMATED COUNT: 12.7 % (ref 10–15)
FIBRINOGEN PPP-MCNC: 532 MG/DL (ref 170–510)
FIBRINOGEN PPP-MCNC: 540 MG/DL (ref 170–510)
FIBRINOGEN PPP-MCNC: 566 MG/DL (ref 170–510)
HCT VFR BLD AUTO: 26.9 % (ref 35–47)
HCT VFR BLD AUTO: 27.9 % (ref 35–47)
HCT VFR BLD AUTO: 33.4 % (ref 35–47)
HGB BLD-MCNC: 11.3 G/DL (ref 11.7–15.7)
HGB BLD-MCNC: 9.2 G/DL (ref 11.7–15.7)
HGB BLD-MCNC: 9.6 G/DL (ref 11.7–15.7)
INR PPP: 1.03 (ref 0.85–1.15)
INR PPP: 1.04 (ref 0.85–1.15)
INR PPP: 1.1 (ref 0.85–1.15)
MCH RBC QN AUTO: 28.3 PG (ref 26.5–33)
MCH RBC QN AUTO: 28.5 PG (ref 26.5–33)
MCH RBC QN AUTO: 28.8 PG (ref 26.5–33)
MCHC RBC AUTO-ENTMCNC: 33.8 G/DL (ref 31.5–36.5)
MCHC RBC AUTO-ENTMCNC: 34.2 G/DL (ref 31.5–36.5)
MCHC RBC AUTO-ENTMCNC: 34.4 G/DL (ref 31.5–36.5)
MCV RBC AUTO: 83 FL (ref 78–100)
MCV RBC AUTO: 84 FL (ref 78–100)
MCV RBC AUTO: 84 FL (ref 78–100)
PLATELET # BLD AUTO: 199 10E3/UL (ref 150–450)
PLATELET # BLD AUTO: 224 10E3/UL (ref 150–450)
PLATELET # BLD AUTO: 235 10E3/UL (ref 150–450)
PROT/CREAT 24H UR: 0.18 MG/MG CR (ref 0–0.2)
RBC # BLD AUTO: 3.2 10E6/UL (ref 3.8–5.2)
RBC # BLD AUTO: 3.37 10E6/UL (ref 3.8–5.2)
RBC # BLD AUTO: 3.99 10E6/UL (ref 3.8–5.2)
T PALLIDUM AB SER QL: NONREACTIVE
WBC # BLD AUTO: 20.9 10E3/UL (ref 4–11)
WBC # BLD AUTO: 25.4 10E3/UL (ref 4–11)
WBC # BLD AUTO: 27 10E3/UL (ref 4–11)

## 2025-01-19 PROCEDURE — 250N000011 HC RX IP 250 OP 636: Performed by: ANESTHESIOLOGY

## 2025-01-19 PROCEDURE — 36415 COLL VENOUS BLD VENIPUNCTURE: CPT

## 2025-01-19 PROCEDURE — 272N000758 HC JADA SYSTEM POST-PARTUM HEMORRAGE CONTROL

## 2025-01-19 PROCEDURE — 85049 AUTOMATED PLATELET COUNT: CPT

## 2025-01-19 PROCEDURE — 0W3R7ZZ CONTROL BLEEDING IN GENITOURINARY TRACT, VIA NATURAL OR ARTIFICIAL OPENING: ICD-10-PCS | Performed by: OBSTETRICS & GYNECOLOGY

## 2025-01-19 PROCEDURE — 250N000009 HC RX 250: Performed by: ANESTHESIOLOGY

## 2025-01-19 PROCEDURE — 84156 ASSAY OF PROTEIN URINE: CPT

## 2025-01-19 PROCEDURE — 85384 FIBRINOGEN ACTIVITY: CPT

## 2025-01-19 PROCEDURE — 10D17Z9 MANUAL EXTRACTION OF PRODUCTS OF CONCEPTION, RETAINED, VIA NATURAL OR ARTIFICIAL OPENING: ICD-10-PCS | Performed by: OBSTETRICS & GYNECOLOGY

## 2025-01-19 PROCEDURE — 250N000011 HC RX IP 250 OP 636

## 2025-01-19 PROCEDURE — 999N000016 HC STATISTIC ATTENDANCE AT DELIVERY

## 2025-01-19 PROCEDURE — 250N000009 HC RX 250

## 2025-01-19 PROCEDURE — 85379 FIBRIN DEGRADATION QUANT: CPT

## 2025-01-19 PROCEDURE — 0KQM0ZZ REPAIR PERINEUM MUSCLE, OPEN APPROACH: ICD-10-PCS | Performed by: OBSTETRICS & GYNECOLOGY

## 2025-01-19 PROCEDURE — 258N000003 HC RX IP 258 OP 636

## 2025-01-19 PROCEDURE — 722N000001 HC LABOR CARE VAGINAL DELIVERY SINGLE

## 2025-01-19 PROCEDURE — 85610 PROTHROMBIN TIME: CPT

## 2025-01-19 PROCEDURE — 3E0R3BZ INTRODUCTION OF ANESTHETIC AGENT INTO SPINAL CANAL, PERCUTANEOUS APPROACH: ICD-10-PCS | Performed by: ANESTHESIOLOGY

## 2025-01-19 PROCEDURE — 85730 THROMBOPLASTIN TIME PARTIAL: CPT

## 2025-01-19 PROCEDURE — 120N000013 HC R&B IMCU

## 2025-01-19 PROCEDURE — 250N000013 HC RX MED GY IP 250 OP 250 PS 637

## 2025-01-19 PROCEDURE — 00HU33Z INSERTION OF INFUSION DEVICE INTO SPINAL CANAL, PERCUTANEOUS APPROACH: ICD-10-PCS | Performed by: ANESTHESIOLOGY

## 2025-01-19 PROCEDURE — 59410 OBSTETRICAL CARE: CPT | Mod: GC

## 2025-01-19 PROCEDURE — 85014 HEMATOCRIT: CPT

## 2025-01-19 PROCEDURE — 370N000003 HC ANESTHESIA WARD SERVICE: Performed by: STUDENT IN AN ORGANIZED HEALTH CARE EDUCATION/TRAINING PROGRAM

## 2025-01-19 PROCEDURE — 250N000011 HC RX IP 250 OP 636: Performed by: OBSTETRICS & GYNECOLOGY

## 2025-01-19 RX ORDER — TRANEXAMIC ACID 10 MG/ML
1 INJECTION, SOLUTION INTRAVENOUS EVERY 30 MIN PRN
Status: DISCONTINUED | OUTPATIENT
Start: 2025-01-19 | End: 2025-01-22 | Stop reason: HOSPADM

## 2025-01-19 RX ORDER — NALOXONE HYDROCHLORIDE 0.4 MG/ML
0.4 INJECTION, SOLUTION INTRAMUSCULAR; INTRAVENOUS; SUBCUTANEOUS
Status: DISCONTINUED | OUTPATIENT
Start: 2025-01-19 | End: 2025-01-22 | Stop reason: HOSPADM

## 2025-01-19 RX ORDER — METHYLERGONOVINE MALEATE 0.2 MG/ML
200 INJECTION INTRAVENOUS
Status: DISCONTINUED | OUTPATIENT
Start: 2025-01-19 | End: 2025-01-22 | Stop reason: HOSPADM

## 2025-01-19 RX ORDER — MISOPROSTOL 200 UG/1
400 TABLET ORAL
Status: DISCONTINUED | OUTPATIENT
Start: 2025-01-19 | End: 2025-01-22 | Stop reason: HOSPADM

## 2025-01-19 RX ORDER — MODIFIED LANOLIN
OINTMENT (GRAM) TOPICAL
Status: DISCONTINUED | OUTPATIENT
Start: 2025-01-19 | End: 2025-01-22 | Stop reason: HOSPADM

## 2025-01-19 RX ORDER — FENTANYL/ROPIVACAINE/NS/PF 2MCG/ML-.1
PLASTIC BAG, INJECTION (ML) EPIDURAL
Status: DISCONTINUED | OUTPATIENT
Start: 2025-01-19 | End: 2025-01-19 | Stop reason: HOSPADM

## 2025-01-19 RX ORDER — FENTANYL CITRATE-0.9 % NACL/PF 10 MCG/ML
100 PLASTIC BAG, INJECTION (ML) INTRAVENOUS EVERY 5 MIN PRN
Status: DISCONTINUED | OUTPATIENT
Start: 2025-01-19 | End: 2025-01-19 | Stop reason: HOSPADM

## 2025-01-19 RX ORDER — LOPERAMIDE HYDROCHLORIDE 2 MG/1
4 CAPSULE ORAL
Status: DISCONTINUED | OUTPATIENT
Start: 2025-01-19 | End: 2025-01-22 | Stop reason: HOSPADM

## 2025-01-19 RX ORDER — OXYTOCIN/0.9 % SODIUM CHLORIDE 30/500 ML
340 PLASTIC BAG, INJECTION (ML) INTRAVENOUS CONTINUOUS PRN
Status: DISCONTINUED | OUTPATIENT
Start: 2025-01-19 | End: 2025-01-22 | Stop reason: HOSPADM

## 2025-01-19 RX ORDER — MORPHINE SULFATE 2 MG/ML
2 INJECTION, SOLUTION INTRAMUSCULAR; INTRAVENOUS ONCE
Status: COMPLETED | OUTPATIENT
Start: 2025-01-19 | End: 2025-01-19

## 2025-01-19 RX ORDER — NALOXONE HYDROCHLORIDE 0.4 MG/ML
0.2 INJECTION, SOLUTION INTRAMUSCULAR; INTRAVENOUS; SUBCUTANEOUS
Status: DISCONTINUED | OUTPATIENT
Start: 2025-01-19 | End: 2025-01-22 | Stop reason: HOSPADM

## 2025-01-19 RX ORDER — DOCUSATE SODIUM 100 MG/1
100 CAPSULE, LIQUID FILLED ORAL DAILY
Status: DISCONTINUED | OUTPATIENT
Start: 2025-01-19 | End: 2025-01-22 | Stop reason: HOSPADM

## 2025-01-19 RX ORDER — SODIUM CHLORIDE, SODIUM LACTATE, POTASSIUM CHLORIDE, CALCIUM CHLORIDE 600; 310; 30; 20 MG/100ML; MG/100ML; MG/100ML; MG/100ML
INJECTION, SOLUTION INTRAVENOUS CONTINUOUS
Status: DISCONTINUED | OUTPATIENT
Start: 2025-01-19 | End: 2025-01-19

## 2025-01-19 RX ORDER — MISOPROSTOL 200 UG/1
800 TABLET ORAL
Status: DISCONTINUED | OUTPATIENT
Start: 2025-01-19 | End: 2025-01-22 | Stop reason: HOSPADM

## 2025-01-19 RX ORDER — NALBUPHINE HYDROCHLORIDE 20 MG/ML
2.5-5 INJECTION, SOLUTION INTRAMUSCULAR; INTRAVENOUS; SUBCUTANEOUS EVERY 6 HOURS PRN
Status: DISCONTINUED | OUTPATIENT
Start: 2025-01-19 | End: 2025-01-22 | Stop reason: HOSPADM

## 2025-01-19 RX ORDER — CARBOPROST TROMETHAMINE 250 UG/ML
250 INJECTION, SOLUTION INTRAMUSCULAR
Status: DISCONTINUED | OUTPATIENT
Start: 2025-01-19 | End: 2025-01-22 | Stop reason: HOSPADM

## 2025-01-19 RX ORDER — HYDROCORTISONE 25 MG/G
CREAM TOPICAL 3 TIMES DAILY PRN
Status: DISCONTINUED | OUTPATIENT
Start: 2025-01-19 | End: 2025-01-22 | Stop reason: HOSPADM

## 2025-01-19 RX ORDER — BUPIVACAINE HYDROCHLORIDE 5 MG/ML
INJECTION, SOLUTION EPIDURAL; INTRACAUDAL PRN
Status: DISCONTINUED | OUTPATIENT
Start: 2025-01-19 | End: 2025-01-19

## 2025-01-19 RX ORDER — ACETAMINOPHEN 325 MG/1
650 TABLET ORAL EVERY 4 HOURS PRN
Status: DISCONTINUED | OUTPATIENT
Start: 2025-01-19 | End: 2025-01-20

## 2025-01-19 RX ORDER — LIDOCAINE HYDROCHLORIDE 20 MG/ML
INJECTION, SOLUTION INFILTRATION; PERINEURAL PRN
Status: DISCONTINUED | OUTPATIENT
Start: 2025-01-19 | End: 2025-01-19

## 2025-01-19 RX ORDER — IBUPROFEN 800 MG/1
800 TABLET, FILM COATED ORAL EVERY 6 HOURS PRN
Status: DISCONTINUED | OUTPATIENT
Start: 2025-01-19 | End: 2025-01-22 | Stop reason: HOSPADM

## 2025-01-19 RX ORDER — SODIUM CHLORIDE, SODIUM LACTATE, POTASSIUM CHLORIDE, CALCIUM CHLORIDE 600; 310; 30; 20 MG/100ML; MG/100ML; MG/100ML; MG/100ML
INJECTION, SOLUTION INTRAVENOUS CONTINUOUS
Status: ACTIVE | OUTPATIENT
Start: 2025-01-19 | End: 2025-01-20

## 2025-01-19 RX ORDER — LOPERAMIDE HYDROCHLORIDE 2 MG/1
2 CAPSULE ORAL
Status: DISCONTINUED | OUTPATIENT
Start: 2025-01-19 | End: 2025-01-22 | Stop reason: HOSPADM

## 2025-01-19 RX ORDER — OXYTOCIN 10 [USP'U]/ML
10 INJECTION, SOLUTION INTRAMUSCULAR; INTRAVENOUS
Status: DISCONTINUED | OUTPATIENT
Start: 2025-01-19 | End: 2025-01-22 | Stop reason: HOSPADM

## 2025-01-19 RX ORDER — BISACODYL 10 MG
10 SUPPOSITORY, RECTAL RECTAL DAILY PRN
Status: DISCONTINUED | OUTPATIENT
Start: 2025-01-19 | End: 2025-01-22 | Stop reason: HOSPADM

## 2025-01-19 RX ORDER — CEFAZOLIN SODIUM/WATER 2 G/20 ML
2 SYRINGE (ML) INTRAVENOUS
Status: COMPLETED | OUTPATIENT
Start: 2025-01-19 | End: 2025-01-19

## 2025-01-19 RX ADMIN — LIDOCAINE HYDROCHLORIDE 10 ML: 20 INJECTION, SOLUTION INFILTRATION; PERINEURAL at 06:44

## 2025-01-19 RX ADMIN — WITCH HAZEL: 500 SOLUTION RECTAL; TOPICAL at 12:54

## 2025-01-19 RX ADMIN — IBUPROFEN 800 MG: 800 TABLET ORAL at 22:04

## 2025-01-19 RX ADMIN — LABETALOL HYDROCHLORIDE 20 MG: 5 INJECTION, SOLUTION INTRAVENOUS at 07:38

## 2025-01-19 RX ADMIN — TRANEXAMIC ACID 1 G: 10 INJECTION, SOLUTION INTRAVENOUS at 09:39

## 2025-01-19 RX ADMIN — BUPIVACAINE HYDROCHLORIDE 6 ML: 5 INJECTION, SOLUTION EPIDURAL; INTRACAUDAL; PERINEURAL at 05:43

## 2025-01-19 RX ADMIN — METHYLERGONOVINE MALEATE 200 MCG: 0.2 INJECTION INTRAVENOUS at 08:59

## 2025-01-19 RX ADMIN — MORPHINE SULFATE 2 MG: 2 INJECTION, SOLUTION INTRAMUSCULAR; INTRAVENOUS at 13:34

## 2025-01-19 RX ADMIN — Medication 2 G: at 09:56

## 2025-01-19 RX ADMIN — NALBUPHINE HYDROCHLORIDE 2.6 MG: 20 INJECTION, SOLUTION INTRAMUSCULAR; INTRAVENOUS; SUBCUTANEOUS at 07:38

## 2025-01-19 RX ADMIN — Medication: at 05:41

## 2025-01-19 RX ADMIN — SODIUM CHLORIDE, POTASSIUM CHLORIDE, SODIUM LACTATE AND CALCIUM CHLORIDE 1000 ML: 600; 310; 30; 20 INJECTION, SOLUTION INTRAVENOUS at 04:45

## 2025-01-19 ASSESSMENT — ACTIVITIES OF DAILY LIVING (ADL)
ADLS_ACUITY_SCORE: 32
ADLS_ACUITY_SCORE: 32
ADLS_ACUITY_SCORE: 18
ADLS_ACUITY_SCORE: 18
ADLS_ACUITY_SCORE: 32
ADLS_ACUITY_SCORE: 18
ADLS_ACUITY_SCORE: 28
ADLS_ACUITY_SCORE: 18
ADLS_ACUITY_SCORE: 32
ADLS_ACUITY_SCORE: 32
ADLS_ACUITY_SCORE: 30
ADLS_ACUITY_SCORE: 18
ADLS_ACUITY_SCORE: 33
ADLS_ACUITY_SCORE: 32
ADLS_ACUITY_SCORE: 18
ADLS_ACUITY_SCORE: 30
ADLS_ACUITY_SCORE: 32
ADLS_ACUITY_SCORE: 32
ADLS_ACUITY_SCORE: 18
ADLS_ACUITY_SCORE: 18
ADLS_ACUITY_SCORE: 32

## 2025-01-19 NOTE — CONSULTS
CONSULTATION - OB    NAME:Dolores Box  : 1995  MRN: [id number]  GESTATIONAL AGE: 37w 6d     [facility]    ADMISSION DATE: 2025    PCP:  Nadya Cuellar     CHIEF COMPLAINT:  Post partum hemorrhage    HPI: I have been requested by Dr. Diaz to evaluate Dolores Box for post partum hemorrhage. Patient is a 29 year old  immediately post partum with an EBL of approximately 800 ml.   She underwent an  without complications.     OBSTETRICAL HISTORY :     G1    PAST MEDICAL HISTORY :  History reviewed. No pertinent past medical history.    PAST SURGICAL HISTORY:  Past Surgical History:   Procedure Laterality Date    MOUTH SURGERY Bilateral 2013    bilateral tonsillectomy       SOCIAL HISTORY :  Social History     Socioeconomic History    Marital status: Single     Spouse name: Not on file    Number of children: Not on file    Years of education: Not on file    Highest education level: Not on file   Occupational History    Not on file   Tobacco Use    Smoking status: Former     Current packs/day: 0.10     Average packs/day: 0.1 packs/day for 4.0 years (0.4 ttl pk-yrs)     Types: Cigarettes     Start date: 2021     Passive exposure: Never    Smokeless tobacco: Never   Substance and Sexual Activity    Alcohol use: No    Drug use: No    Sexual activity: Yes   Other Topics Concern    Not on file   Social History Narrative    Not on file     Social Drivers of Health     Financial Resource Strain: Low Risk  (2025)    Financial Resource Strain     Within the past 12 months, have you or your family members you live with been unable to get utilities (heat, electricity) when it was really needed?: No   Food Insecurity: Low Risk  (2025)    Food Insecurity     Within the past 12 months, did you worry that your food would run out before you got money to buy more?: No     Within the past 12 months, did the food you bought just not last and you didn t have money to  get more?: No   Transportation Needs: Low Risk  (1/18/2025)    Transportation Needs     Within the past 12 months, has lack of transportation kept you from medical appointments, getting your medicines, non-medical meetings or appointments, work, or from getting things that you need?: No   Physical Activity: Not on File (3/8/2021)    Received from [facility]    Physical Activity     Physical Activity: 0   Stress: Not on File (3/8/2021)    Received from [facility]    Stress     Stress: 0   Social Connections: Not on File (3/8/2021)    Received from [facility]    Social Connections     Social Connections and Isolation: 0   Interpersonal Safety: Low Risk  (1/18/2025)    Interpersonal Safety     Do you feel physically and emotionally safe where you currently live?: Yes     Within the past 12 months, have you been hit, slapped, kicked or otherwise physically hurt by someone?: No     Within the past 12 months, have you been humiliated or emotionally abused in other ways by your partner or ex-partner?: No   Housing Stability: Low Risk  (1/18/2025)    Housing Stability     Do you have housing? : Yes     Are you worried about losing your housing?: No       MEDICATIONS:  Current Facility-Administered Medications   Medication Dose Route Frequency Provider Last Rate Last Admin    acetaminophen (TYLENOL) tablet 650 mg  650 mg Oral Q4H PRN Josh Lott MD        benzocaine-menthol (DERMOPLAST) topical spray   Topical 4x Daily PRN Josh Lott MD        bisacodyl (DULCOLAX) suppository 10 mg  10 mg Rectal Daily PRN Josh Lott MD        calcium carbonate (TUMS) chewable tablet 500 mg  500 mg Oral Daily PRN Haroon Odonnell MD   500 mg at 01/18/25 1540    carboprost (HEMABATE) injection 250 mcg  250 mcg Intramuscular Q15 Min PRN Josh Lott MD        And    loperamide (IMODIUM) capsule 4 mg  4 mg Oral Once PRN Josh Lott MD        docusate sodium (COLACE) capsule 100 mg  100 mg Oral Daily Oren  Josh Campos MD        hydrALAZINE (APRESOLINE) injection 10 mg  10 mg Intravenous Q20 Min PRN Maritza Robledo MD        hydrocortisone (Perianal) (ANUSOL-HC) 2.5 % cream   Rectal TID PRN Josh Lott MD        ibuprofen (ADVIL/MOTRIN) tablet 800 mg  800 mg Oral Q6H PRN Josh Lott MD        labetalol (NORMODYNE/TRANDATE) injection 20-80 mg  20-80 mg Intravenous Q10 Min PRN Maritza Robledo MD   20 mg at 01/19/25 0738    lactated ringers infusion   Intravenous Continuous Josh Lott  mL/hr at 01/19/25 0958 Rate Change at 01/19/25 0958    lactated ringers infusion   Intravenous Continuous Haroon Odonnell  mL/hr at 01/18/25 2144 Restarted at 01/18/25 2144    lanolin cream   Topical Q1H PRN Josh Lott MD        lidocaine (LMX4) cream   Topical Q1H PRN Maritza Robledo MD        lidocaine 1 % 0.1-1 mL  0.1-1 mL Other Q1H PRN Maritza Robledo MD        lidocaine 1 % 0.1-20 mL  0.1-20 mL Subcutaneous Once PRN Michelle Mills MD        loperamide (IMODIUM) capsule 2 mg  2 mg Oral Q2H PRN Josh Lott MD        [START ON 1/20/2025] Measles, Mumps & Rubella Vac (MMR) injection 0.5 mL  0.5 mL Subcutaneous Once Josh Lott MD        methylergonovine (METHERGINE) injection 200 mcg  200 mcg Intramuscular Q2H PRN Josh Lott MD        misoprostol (CYTOTEC) tablet 400 mcg  400 mcg Oral ONCE PRN REPEAT PER INSTRUCTIONS Josh Lott MD        Or    misoprostol (CYTOTEC) tablet 800 mcg  800 mcg Rectal ONCE PRN REPEAT PER INSTRUCTIONS Josh Lott MD        nalbuphine (NUBAIN) injection 2.6-5 mg  2.6-5 mg Intravenous Q6H PRN Rohan Cedeno MD   2.6 mg at 01/19/25 0738    oxytocin (PITOCIN) 30 units in 500 mL 0.9% NaCl infusion  340 mL/hr Intravenous Continuous PRN Josh Lott MD        oxytocin (PITOCIN) 30 units in 500 mL 0.9% NaCl infusion  100-340 mL/hr Intravenous Continuous PRN Michelle Mills  mL/hr at 01/19/25  "0849 340 mL/hr at 25 0849    oxytocin (PITOCIN) injection 10 Units  10 Units Intramuscular Once PRN Josh Lott MD        oxytocin (PITOCIN) injection 10 Units  10 Units Intramuscular Once PRN Michelle Mills MD        sodium chloride (PF) 0.9% PF flush 3 mL  3 mL Intracatheter Q8H Maritza Robledo MD   3 mL at 25 0939    sodium chloride (PF) 0.9% PF flush 3 mL  3 mL Intracatheter q1 min prn Maritza Robledo MD        [START ON 2025] Tdap (tetanus-diphtheria-acell pertussis) (ADACEL) injection 0.5 mL  0.5 mL Intramuscular Once Josh Lott MD        tranexamic acid 1 g in 100 mL NS IV bag (premix)  1 g Intravenous Q30 Min PRN Josh Lott MD   1 g at 25 0939    witch hazel-glycerin (TUCKS) pad   Topical Q1H PRN Josh Lott MD           ALLERGIES:  No Known Allergies    REVIEW OF SYSTEMS   Negative except what is stated in the HPI    PHYSICAL EXAM:  /66   Pulse 97   Temp 99.6  F (37.6  C) (Oral)   Resp 18   Ht 1.6 m (5' 3\")   Wt 100.2 kg (221 lb)   LMP 2024 (Exact Date)   SpO2 97%   Breastfeeding Unknown   BMI 39.15 kg/m     General Appearance: Alert, appropriate appearance for age. No acute distress,   HEENT Exam: Grossly normal.,   Chest/Respiratory: Clear to auscultation.  Cardiovascular: Regular rate and rhythm  Gastrointestinal Exam: gravid, soft, non-tender    Second degree midline laceration  1st degree left labial laceration  Uterine atony. No retained tissue.     Second degree repaired with 3.0 vicryl.   Lizarraga catheter placed.   Manual curettage performed. No retained tissue. Atony present.   BERNA placed. Hooked up to suction .  2 interrupted sutures of 4.0 vicryl placed in the perineum.     LABS:  Hemoglobin   Date Value Ref Range Status   2025 11.3 (L) 11.7 - 15.7 g/dL Final       IMPRESSION:  29 year old,  with post partum hemorrhage 2/2 uterine atony  -- Methergine given.   -- BERNA placed.   -- 800 mcg of Cytotec " rectally  -- Will give TXA  -- Stage III post partum hemorrhage orders placed  -- 2 g Ancef.   -- Will monitor closely    Thank you for allowing us to participate in the care of this patient.  Please contact us with any questions/concerns.      Moni Schrader MD   Office [phone]    CC: Nadya Cuellar

## 2025-01-19 NOTE — PROVIDER NOTIFICATION
01/18/25 1919   Vital Signs   Oximeter Heart Rate 72 bpm   BP (!) 153/90     Provider notified regarding above blood pressure and assessment at 1945. Orders requested.

## 2025-01-19 NOTE — PROGRESS NOTES
Order for protein random urine discussed with patient at 2013. Patient would like to postpone collection 1 or 2 hours . Education on importance of result for ruling out pre eclampsia provided.

## 2025-01-19 NOTE — PROGRESS NOTES
Time 7:54 PM January 18, 2025    Description:  Notified by nurse about 2 elevated blood pressure readings.  - 146/93 at 1730  - 153/90 at 1919     Plan:  - HTN order set with labetalol   - CBC with platelets  - CMP  - urine protein    Will get labs to evaluate for preeclampsia.     Discussed with: supervising physician, Dr. Joe Robledo MD

## 2025-01-19 NOTE — PROGRESS NOTES
Data: Dolores Box transferred to [facility]18/[id number] via Zoom Cart. Patient transferred to Postpartum with .    Action: Receiving unit notified of transfer. Patient and support person notified of room change. Patient and belongings accompanied by Registered Nurse. Bedside report given to Leticia Arteaga RN. Fundal check performed with receiving RN. Oriented patient to surroundings. Call light within reach.    Response: Patient tolerated transfer.    Mia Romero RN  2025 12:42 PM

## 2025-01-19 NOTE — PROGRESS NOTES
Provider notification:   Provider: Maritza Cuellar MD was notified at 0417 regarding: a persistent Category II fetal heart rate tracing for 70 minutes.    Category II Algorithm     Fetal heart rate and uterine activity reviewed with provider.    EFM interpretation suggests: concern for persistent Category II tracing due to: minimal variability.  EFM suggests concern for interruption of the oxygen pathway due to:  variable decelerations.     Interventions to improve fetal oxygenation for a Category II tracing include: maternal positioning, oxytocin discontinued, and sterile vaginal exam    After discussion with provider: Continue to monitor

## 2025-01-19 NOTE — L&D DELIVERY NOTE
Delivery Summary  [facility]  Date of Service: 2025    Name      Dolores Box         1995  MRN       [id number]  [facility]    DELIVERY NARRATIVE    On 2025 Dolores Box delivered a viable female infant at 37w6d with apgars of 9 and 9 via Vaginal, SpontaneousDelivery.    Dolores presented to Maternity Care on 2025 for assessment of vaginal bleding.     Her group B Strep (GBS) carrier status was negative. She received oral misoprostol (Cytotec) and IV pitocin for induction/augmentation. She received an epidural for analgesia.    Delivery was via vaginal, spontaneousdelivery to a sterile field under epidural;nitrous oxide anesthesia. Infant delivered in vertex      position. Shoulders delivered without difficulty. The baby was placed on the patient's abdomen. Cord complications: none. Delayed cord clamping was performed. 3 vessels were noted.     Placenta delivered at 2025  8:56 AM. Placenta was noted to be intact. Fundal massage performed and fundus found to be boggy, but moderately responsive to massage. The following uterotonics were given: Pitocin (IV), Misoprostol (rectal), Methergine, and TXA. Perineum, vagina, cervix were inspected, and the following lacerations were noted: 2nd degree perineal. Lacerations were repaired in the usual fashion using 3-0 Vicryl by Dr Schrader to make placement of BERNA more efficient. QBL: 1500 mL.     800ml was noted and she continued to have uterine bleeding.  OB was consulted for placement of BERNA device.    A uterine sweep was done to remove clots before the OB provider arrived.    A BERNA was placed by Dr. Schrader and she was in stable condition when we left.    9:54 AM  There is 200ml in suction canister    Needle and sponge count correct. Infant and patient in delivery room in good and stable condition.   _________________    GA: 37w6d  GP:   Labor Complications:  None  Additional Complications:    QBL:    Delivery Type: Vaginal, Spontaneous  Duration of Ruptured Membranes: 28h 52m   Weight: 3.35 kg (7 lb 6.2 oz)  Apgar scores: 9 , 9         Jhonatan Hightower [[id number]]      Labor Event Times      Dilation complete date: 25 Complete time:  7:29 AM   Start pushing date/time: 2025 0729          Labor Events     labor?: No   steroids: None  Labor Type: Augmentation  Predominate monitoring during 1st stage: continuous electronic fetal monitoring     Antibiotics received during labor?: No     Rupture identifier: Sac 1  Rupture date/time: 25 0357   Rupture type: Spontaneous Rupture of Membranes  Fluid color: Pink  Fluid odor: Normal     Induction: Misoprostol  Induction date/time:      Cervical ripening date/time:      Indications for induction: Prelabor ROM     Augmentation: Oxytocin       Delivery/Placenta Date and Time      Delivery Date: 25 Delivery Time:  8:49 AM   Placenta Date/Time: 2025  8:56 AM  Oxytocin given at the time of delivery: after delivery of baby  Delivering clinician: Moni Diaz MD   Other personnel present at delivery:  Provider Role   Maribell Andrews RN Registered Nurse   Mia Romero RN Registered Nurse   Maritza Huang RN Registered Nurse   Cecy Pickering RT Respiratory Therapist   Cristina Brambila APRN CNP Nurse Practitioner             Vaginal Counts       Initial count performed by 2 team members:  Two Team Members   Joe Andrews         Needles Suture Needles Sponges (RETIRED) Instruments   Initial counts   5    Added to count       Relief counts       Final counts               Placed during labor Accounted for at the end of labor   FSE No NA   IUPC No NA   Cervidil No NA           Relief count performed by 2 team members:  Two Team Members   gregory andrews         Final count performed by 2 team members:  Two Team Members   gregory andrews     "  Pre-Birth Team Brief: Complete  Post-Birth Team Debrief: Complete       Apgars    Living status: Living   1 Minute 5 Minute 10 Minute 15 Minute 20 Minute   Skin color: 1  1       Heart rate: 2  2       Reflex irritability: 2  2       Muscle tone: 2  2       Respiratory effort: 2  2       Total: 9  9       Apgars assigned by: ADDY SALGADO       Cord      Vessels: 3 Vessels    Cord Complications: None               Cord Blood Disposition: Lab    Gases Sent?: No    Delayed cord clamping?: Yes    Cord Clamping Delay (seconds): >120 seconds           [place] Resuscitation    Methods: None  [place] Care at Delivery: [facility] DELIVERY ATTENDANCE NOTE    Asked to attend delivery by Dr. Diaz secondary to fetal distress.  Now at Gestational Age: 37w6d gestation.    Pregnancy complicated by limited prenatal care, new onset PIH requiring labetalol dose x 1 (pre-E labs normal), and prolonged ROM > 24 hours. GBS screen negative.       Born at 8:49 am on 25. Infant had spontaneous respirations, normal tone, and lusty cry at birth. Placed on mother's chest, delayed cord clamping was done. She responded well to routine drying/stimulation and bulb suctioning. No further NICU intervention needed, exam deferred. Apgar scores to be assigned by OB staff. Infant to be admitted to  nursery.       [facility] at 8:54 AM                Measurements      Weight: 7 lb 6.2 oz Length: 1' 8.67\"     Head circumference: 34 cm           Skin to Skin and Feeding Plan      Skin to skin initiation date/time: 1841    Skin to skin with: Mother  Skin to skin end date/time: 1841           Labor Events and Shoulder Dystocia    Fetal Tracing Prior to Delivery: Category 1  Shoulder dystocia present?: Neg       Delivery (Maternal) (Provider to Complete) ([id number])    Episiotomy: None  Perineal lacerations: 2nd Repaired?: Yes   Repair suture: 3-0 Vicryl  Genital tract inspection done: Pos   "     Blood Loss  Mother: Dolores Hightower #[id number]     Start of Mother's Information      Delivery Blood Loss   Intrapartum & Postpartum: 01/18/25 2049 - 01/19/25 0937    Delivery Admission: 01/18/25 0333 - 01/19/25 0937         Intrapartum & Postpartum Delivery Admission    Delivery QBL (mL) Hospital Encounter 184 mL 184 mL    Total  184 mL 184 mL               End of Mother's Information  Mother: Dolores Hightower #[id number]                Delivery - Provider to Complete ([id number])    Delivering clinician: Moni Diaz MD  Delivery Type (Choose the 1 that will go to the Birth History): Vaginal, Spontaneous                         Other personnel:  Provider Role   Maribell Andrews RN Registered Nurse   Mia Romero RN Registered Nurse   Maritza Huang RN Registered Nurse   Cecy Pickering,  Respiratory Therapist   Cristina Brambila APRN CNP Nurse Practitioner                    Placenta    Date/Time: 1/19/2025  8:56 AM  Removal: Spontaneous  Disposition: Hospital disposal             Anesthesia    Method: Epidural, Nitrous Oxide                    Presentation and Position    Presentation: Vertex                    Completed by:   Josh Lott MD  [facility]   1/19/2025 9:27 AM     Faculty Supervision of Residents:    I was present for entirety of vaginal delivery of live infant.  Delivery complicated by PROM and postpartum hemorrhage.  See resident note to follow for details of delivery.  Child will stay in room with mother.  Routine cares.    Moni Diaz MD

## 2025-01-19 NOTE — PROGRESS NOTES
4:19 AM     Aware that patient is declining urinary catheterization for urine protein level. Remainder of preeclampsia/HELLP labs unremarkable. Patient has not had recurrence of hypertension.     Description:  Notified by nurse about minimal variability on fetal tracing.     Checked at 0255 and was 4.5 cm/100%/-1. Due to minimal change in dilation pitocin was restarted. Minimally variability on fetal tracing since. Tried repositioning and fluids.     Patient continues to be uncomfortable. Moving with each contraction and up to bathroom. Requesting epidural     Plan:  - continue positional changes  - hold pitocin  - okay for epidural     Discussed with: supervising physician, Dr. Joe Robledo MD

## 2025-01-19 NOTE — PROCEDURES
Lisa Procedure note    Date: 1/19/2025    Indication: Postpartum hemorrhage due to uterine atony    Procedure:Lisa Placement    Technique: The patient was evaluated for lacerations, retained products of conception, or other causes of bleeding.  A manual sweep was performed with the patient's consent to evaluate and remove any organized clots prior to using Lisa. The bladder was emptied by placing a Lizarraga catheter in the usual sterile fashion. A syringe was attached to the cervical seal and all air removed before placement. The cervix was visualized and confirmed to be at least 3 cm dilated. The intrauterine loop was compressed manually and inserted transvaginally into the uterus and seal valve oriented and 3 or 9 o clock. The cervical seal was noted to be in the vagina at the external os. The cervical seal was then filled with 120 ml sterile saline. Suction was turned on and verified at 80 mm Hg by occluding the end of the tubing.  Suction attached to Lisa.  Lisa was taped securely to patient's inner leg.  Fundus remains firm and at umbilicus.      Plan to recheck Lisa in one hour after placement to evaluate for removal if patient stable, bleeding improved and fundus firm at which time the saline will be removed from the cervical seal and vacuum detached from the Lisa device.  While Lisa is still in place, the patient's bleeding will be monitored for at least 30 minutes before   removing the Lisa device.    Shortly after placing the Lisa there was 100 ml in the canister and 100 ml in the tubing.   Has had minimal increase in blood in the cansiter since placement. Will monitor closely.   Stage 3 hemorrhage orders placed    Moni Schrader MD  1/19/2025 9:37 AM

## 2025-01-19 NOTE — PLAN OF CARE
Problem: Adult Inpatient Plan of Care  Goal: Plan of Care Review  Description: The Plan of Care Review/Shift note should be completed every shift.  The Outcome Evaluation is a brief statement about your assessment that the patient is improving, declining, or no change.  This information will be displayed automatically on your shift  note.  2025 103 by Maribell Andrews RN  Outcome: Progressing  Flowsheets (Taken 2025)  Outcome Evaluation:  of viable female at 0849. PPH immed after. QBL 1684cc. 2nd degree lac repaired. Ancef given after BERNA placed. Will leave BERNA in for a few hours, per Dr. Schrader. Lizarraga in place and patent.   Plan of Care Reviewed With: patient  2025 103 by Maribell Andrews RN  Outcome: Progressing  Flowsheets (Taken 2025)  Outcome Evaluation:  of viable female at 0849. PPH immed after. QBL 1684cc. 2nd degree lac repaired. Ancef given after BERNA placed. Will leave BERNA in for a few hours, per Dr. Schrader.  Plan of Care Reviewed With: patient    Problem: Adult Inpatient Plan of Care  Goal: Optimal Comfort and Wellbeing  2025 103 by Maribell Andrews RN  Outcome: Progressing    Problem: Postpartum (Vaginal Delivery)  Goal: Effective Urinary Elimination  2025 1033 by Maribell Andrews RN  Outcome: Progressing  Lizarraga patent     Goal Outcome Evaluation:      Plan of Care Reviewed With: patient          Outcome Evaluation:  of viable female at 0849. PPH immed after. QBL 1684cc. 2nd degree lac repaired. Ancef given after BERNA placed. Will leave BERNA in for a few hours, per Dr. Schrader. Lizarraga placed and is patent.Maribell Andrews, RN

## 2025-01-19 NOTE — PROGRESS NOTES
Order for protein random urine discussed with patient again at 0040. Patient declines straight catheter necessary to obtain sample at this time.

## 2025-01-19 NOTE — PLAN OF CARE
Problem: Postpartum (Vaginal Delivery)  Goal: Hemostasis  Outcome: Progressing  Hgb = 9.6; fundus firm; IV fluids (LR) running; pulse elevated; Pt NPO;  BERNA suction stopped at this this time (per order from Dr. Schrader).    Problem: Postpartum (Vaginal Delivery)  Goal: Optimal Pain Control and Function  Outcome: Progressing  A dose of IV Morphine was given earlier. Pt denies pain at this time.     Problem: Postpartum (Vaginal Delivery)  Goal: Effective Urinary Elimination  Outcome: Progressing  Lizarraga catheter in place.

## 2025-01-19 NOTE — PLAN OF CARE
Goal Outcome Evaluation:  Vital sign assessments stable. Patient has not had any severe range blood pressures this shift. She denies headache, visual disturbances, epigastric pain. Patient manda every 3 to 5 minutes.    Plan of Care Reviewed With: patient    Overall Patient Progress: improvingOverall Patient Progress: improving

## 2025-01-19 NOTE — PROGRESS NOTES
D:  Patient admitted to [facility]/[id number] via bed with  and support person Jeffry.  A:  Bedside report received from Mia Romero. Oriented patient and family to surroundings; call light within reach. 4 Part ID bands double checked with reporting RN.  R:  Patient and  tolerated transfer. Care assumed.

## 2025-01-19 NOTE — PROGRESS NOTES
Assumed care of pt after receiving bedside report from MARILYNN Mai.   Dr. Diaz and Dr. Cuellar bedside as well.   Pt moaning with cxns. Feeling pressure to butt. Encouragement given. Coaching on effective breathing done.   Anterior lip reduced by MD and pt proceeded to push. Pt pushed in lithotomy position and in left lateral position. Pitocin restarted at 2mu/min at 0803 and increased to 4mu/min at 0833.   Mds switched out, and Dr. Lott assumed care. Dr. Cuellar relinquished care.   Pt delivered a viable female at 0849. Brisk bleeding started almost immediately. Pitocin rate changed to 340m/hrl for 170ml. Methergine given per MD request. Asked for Dr. Schrader to come assist. Called for PPH cart and BERNA.   After perineal lac repaired by , manual sweep done and BERNA placed.   QBL after delivery was approx 1700ml with another 100 ml since. Dr. Schrader aware. .Maribell Adnrews RN

## 2025-01-19 NOTE — ANESTHESIA PROCEDURE NOTES
"Epidural catheter Procedure Note    Pre-Procedure   Staff -        Anesthesiologist:  Rohan Cedeno MD       Performed By: anesthesiologist       Location: OB       Procedure Start/Stop Times: 1/19/2025 5:34 AM and 1/19/2025 5:48 AM       Pre-Anesthestic Checklist: patient identified, IV checked, risks and benefits discussed, informed consent, monitors and equipment checked, pre-op evaluation, at physician/surgeon's request and post-op pain management  Timeout:       Correct Patient: Yes        Correct Procedure: Yes        Correct Site: Yes        Correct Position: Yes   Procedure Documentation  Procedure: epidural catheter         Patient Position: RLD       Skin prep: Chloraprep       Local skin infiltrated with 3 mL of 1% lidocaine.        Insertion Site: L2-3. (midline approach).       Technique: LORT air        Needle Type: Touhy needle          Catheter threaded easily.           Threaded 6 cm at skin.         # of attempts: 1 and  # of redirects:     Assessment/Narrative         Paresthesias: No.       Test dose of 3 mL at 05:41 CST.         Test dose negative, 3 minutes after injection, for signs of intravascular, subdural, or intrathecal injection.       Insertion/Infusion Method: LORT air       Aspiration negative for Heme or CSF via Epidural Catheter.       Sensory Level Left: T10.       Sensory Level Right: T10.    Medication(s) Administered   Medication Administration Time: 1/19/2025 5:34 AM      FOR [facility]) ONLY:   Pain Team Contact information: please page the Pain Team Via JOOR. Search \"Pain\". During daytime hours, please page the attending first. At night please page the resident first.      "

## 2025-01-19 NOTE — ANESTHESIA PREPROCEDURE EVALUATION
Anesthesia Pre-Procedure Evaluation    Patient: Dolores Box   MRN: [id number] : 1995        Procedure :           History reviewed. No pertinent past medical history.   Past Surgical History:   Procedure Laterality Date    MOUTH SURGERY Bilateral 2013    bilateral tonsillectomy      No Known Allergies   Social History     Tobacco Use    Smoking status: Former     Current packs/day: 0.10     Average packs/day: 0.1 packs/day for 4.0 years (0.4 ttl pk-yrs)     Types: Cigarettes     Start date: 2021     Passive exposure: Never    Smokeless tobacco: Never   Substance Use Topics    Alcohol use: No      Wt Readings from Last 1 Encounters:   25 100.2 kg (221 lb)        Anesthesia Evaluation   Pt has had prior anesthetic.         ROS/MED HX  ENT/Pulmonary:       Neurologic:       Cardiovascular:       METS/Exercise Tolerance:     Hematologic:       Musculoskeletal:       GI/Hepatic:       Renal/Genitourinary:       Endo:       Psychiatric/Substance Use:       Infectious Disease:       Malignancy:       Other:            Physical Exam    Airway        Mallampati: I   TM distance: > 3 FB   Neck ROM: full     Respiratory Devices and Support         Dental           Cardiovascular             Pulmonary                   OUTSIDE LABS:  CBC:   Lab Results   Component Value Date    WBC 13.6 (H) 2025    WBC 10.5 2025    HGB 11.3 (L) 2025    HGB 11.4 (L) 2025    HCT 33.4 (L) 2025    HCT 33.1 (L) 2025     2025     2025     BMP:   Lab Results   Component Value Date     (L) 2025     (L) 2025    POTASSIUM 3.9 2025    POTASSIUM 4.1 2025    CHLORIDE 103 2025    CHLORIDE 105 2025    CO2 21 (L) 2025    CO2 18 (L) 2025    BUN 10.9 2025    BUN 11.7 2025    CR 0.67 2025    CR 0.64 2025     (H) 2025     (H) 2025     COAGS: No results found  "for: \"PTT\", \"INR\", \"FIBR\"  POC:   Lab Results   Component Value Date    HCG Negative 10/09/2021    HCGS Negative 10/22/2018     HEPATIC:   Lab Results   Component Value Date    ALBUMIN 3.1 (L) 01/18/2025    PROTTOTAL 5.8 (L) 01/18/2025    ALT 9 01/18/2025    AST 14 01/18/2025    ALKPHOS 170 (H) 01/18/2025    BILITOTAL 0.2 01/18/2025     OTHER:   Lab Results   Component Value Date    MARQUIS 9.6 01/18/2025    MAG 1.5 (L) 01/18/2025    LIPASE 43 07/16/2024       Anesthesia Plan    ASA Status:  1       Anesthesia Type: Epidural.              Consents    Anesthesia Plan(s) and associated risks, benefits, and realistic alternatives discussed. Questions answered and patient/representative(s) expressed understanding.     - Discussed: Risks, Benefits and Alternatives for the PROCEDURE were discussed     - Discussed with:  Patient, Spouse            Postoperative Care            Comments:               Rohan Cedeno MD    I have reviewed the pertinent notes and labs in the chart from the past 30 days and (re)examined the patient.  Any updates or changes from those notes are reflected in this note.     # Hyponatremia: Lowest Na = 134 mmol/L in last 2 days, will monitor as appropriate    # Hypercalcemia: corrected calcium is >10.1, will monitor as appropriate  # Hypomagnesemia: Lowest Mg = 1.5 mg/dL in last 2 days, will replace as needed   # Hypoalbuminemia: Lowest albumin = 3.1 g/dL at 1/18/2025  8:12 PM, will monitor as appropriate                         "

## 2025-01-20 LAB
APTT PPP: 31 SECONDS (ref 22–38)
D DIMER PPP FEU-MCNC: 0.87 UG/ML FEU (ref 0–0.5)
ERYTHROCYTE [DISTWIDTH] IN BLOOD BY AUTOMATED COUNT: 12.8 % (ref 10–15)
ERYTHROCYTE [DISTWIDTH] IN BLOOD BY AUTOMATED COUNT: 13 % (ref 10–15)
FIBRINOGEN PPP-MCNC: 564 MG/DL (ref 170–510)
HCT VFR BLD AUTO: 25.3 % (ref 35–47)
HCT VFR BLD AUTO: 25.7 % (ref 35–47)
HGB BLD-MCNC: 8.5 G/DL (ref 11.7–15.7)
HGB BLD-MCNC: 8.7 G/DL (ref 11.7–15.7)
HOLD SPECIMEN: NORMAL
HOLD SPECIMEN: NORMAL
INR PPP: 1.05 (ref 0.85–1.15)
MCH RBC QN AUTO: 28.3 PG (ref 26.5–33)
MCH RBC QN AUTO: 28.7 PG (ref 26.5–33)
MCHC RBC AUTO-ENTMCNC: 33.6 G/DL (ref 31.5–36.5)
MCHC RBC AUTO-ENTMCNC: 33.9 G/DL (ref 31.5–36.5)
MCV RBC AUTO: 84 FL (ref 78–100)
MCV RBC AUTO: 85 FL (ref 78–100)
PLATELET # BLD AUTO: 199 10E3/UL (ref 150–450)
PLATELET # BLD AUTO: 205 10E3/UL (ref 150–450)
RBC # BLD AUTO: 3 10E6/UL (ref 3.8–5.2)
RBC # BLD AUTO: 3.03 10E6/UL (ref 3.8–5.2)
WBC # BLD AUTO: 17.2 10E3/UL (ref 4–11)
WBC # BLD AUTO: 18.6 10E3/UL (ref 4–11)

## 2025-01-20 PROCEDURE — 250N000013 HC RX MED GY IP 250 OP 250 PS 637

## 2025-01-20 PROCEDURE — 85379 FIBRIN DEGRADATION QUANT: CPT

## 2025-01-20 PROCEDURE — 36415 COLL VENOUS BLD VENIPUNCTURE: CPT

## 2025-01-20 PROCEDURE — 85014 HEMATOCRIT: CPT

## 2025-01-20 PROCEDURE — 85610 PROTHROMBIN TIME: CPT

## 2025-01-20 PROCEDURE — 85384 FIBRINOGEN ACTIVITY: CPT

## 2025-01-20 PROCEDURE — 85730 THROMBOPLASTIN TIME PARTIAL: CPT

## 2025-01-20 PROCEDURE — 120N000013 HC R&B IMCU

## 2025-01-20 PROCEDURE — 250N000011 HC RX IP 250 OP 636

## 2025-01-20 PROCEDURE — 99207 PR NO CHARGE LOS: CPT | Mod: GC

## 2025-01-20 RX ORDER — KETOROLAC TROMETHAMINE 30 MG/ML
30 INJECTION, SOLUTION INTRAMUSCULAR; INTRAVENOUS EVERY 6 HOURS PRN
Status: DISCONTINUED | OUTPATIENT
Start: 2025-01-21 | End: 2025-01-20

## 2025-01-20 RX ORDER — KETOROLAC TROMETHAMINE 30 MG/ML
15 INJECTION, SOLUTION INTRAMUSCULAR; INTRAVENOUS EVERY 6 HOURS PRN
Status: DISCONTINUED | OUTPATIENT
Start: 2025-01-21 | End: 2025-01-22 | Stop reason: HOSPADM

## 2025-01-20 RX ORDER — ACETAMINOPHEN 325 MG/1
975 TABLET ORAL 3 TIMES DAILY
Status: DISCONTINUED | OUTPATIENT
Start: 2025-01-21 | End: 2025-01-22 | Stop reason: HOSPADM

## 2025-01-20 RX ORDER — KETOROLAC TROMETHAMINE 30 MG/ML
30 INJECTION, SOLUTION INTRAMUSCULAR; INTRAVENOUS EVERY 6 HOURS PRN
Status: COMPLETED | OUTPATIENT
Start: 2025-01-20 | End: 2025-01-20

## 2025-01-20 RX ORDER — KETOROLAC TROMETHAMINE 30 MG/ML
15 INJECTION, SOLUTION INTRAMUSCULAR; INTRAVENOUS EVERY 6 HOURS PRN
Status: DISCONTINUED | OUTPATIENT
Start: 2025-01-21 | End: 2025-01-20

## 2025-01-20 RX ADMIN — ACETAMINOPHEN 650 MG: 325 TABLET ORAL at 09:56

## 2025-01-20 RX ADMIN — KETOROLAC TROMETHAMINE 30 MG: 30 INJECTION, SOLUTION INTRAMUSCULAR at 19:52

## 2025-01-20 RX ADMIN — ACETAMINOPHEN 650 MG: 325 TABLET ORAL at 02:24

## 2025-01-20 RX ADMIN — ACETAMINOPHEN 650 MG: 325 TABLET ORAL at 16:07

## 2025-01-20 RX ADMIN — ACETAMINOPHEN 650 MG: 325 TABLET ORAL at 06:21

## 2025-01-20 RX ADMIN — ACETAMINOPHEN 650 MG: 325 TABLET ORAL at 19:52

## 2025-01-20 RX ADMIN — BENZOCAINE AND LEVOMENTHOL: 200; 5 SPRAY TOPICAL at 02:08

## 2025-01-20 RX ADMIN — IBUPROFEN 800 MG: 800 TABLET ORAL at 09:56

## 2025-01-20 RX ADMIN — KETOROLAC TROMETHAMINE 30 MG: 30 INJECTION, SOLUTION INTRAMUSCULAR at 13:23

## 2025-01-20 RX ADMIN — IBUPROFEN 800 MG: 800 TABLET ORAL at 04:06

## 2025-01-20 RX ADMIN — DOCUSATE SODIUM 100 MG: 100 CAPSULE, LIQUID FILLED ORAL at 09:55

## 2025-01-20 ASSESSMENT — ACTIVITIES OF DAILY LIVING (ADL)
ADLS_ACUITY_SCORE: 36
ADLS_ACUITY_SCORE: 32
ADLS_ACUITY_SCORE: 36
ADLS_ACUITY_SCORE: 32
ADLS_ACUITY_SCORE: 32
ADLS_ACUITY_SCORE: 36
ADLS_ACUITY_SCORE: 36
ADLS_ACUITY_SCORE: 32
ADLS_ACUITY_SCORE: 36
ADLS_ACUITY_SCORE: 36
ADLS_ACUITY_SCORE: 32
ADLS_ACUITY_SCORE: 32
ADLS_ACUITY_SCORE: 36
ADLS_ACUITY_SCORE: 32

## 2025-01-20 NOTE — PROGRESS NOTES
Brien has now eaten and drank without difficulty since having her Lisa removed. Her urine is darker in color but she is drinking well and is having adequate output. She was feeling ready to get up and out of bed for her first time since delivery and was able to sit up in bed, dangle legs, stand, and march at bedside. She did note that she was feeling exhausted from this and felt she could not make it to the toilet and wanted to lay back down in bed. She denies dizziness, feeling faint, visual changes etc.. she was weary about lama removal and RN discussed options and updated provider on her request. This Rn updated provider TS and he was in favor with lama removal if the patient was open to using bed pan or commode. Brien as indeed in favor of these options after thinking about them. Lama catheter removed at 2207. She has been encouraged to increase her fluid intake and she has already drank 500 ml of water since RN came to unit at 1900, she also had juice and soda at her bedside. This RN filled up her water jug now as well. She continues to have intermittent cramping and has now requested PRN ibuprofen, she does not want to be woken up when this is available again and is aware she also has PRN tylenol available as well for other pain management. Besides medication she is using ice packs for comfort in per perineal area, pillow support, and rest. She wishes to sleep until her infant is ready to breastfeed again and she will call nursing staff when she is awake.       Clemencia Tesfaye, and PADMINI briefly went over ROP and birth certificate paperwork. They are interested in completing ROP  and understand birth certificate edu and will start this in the daytime hours when they are more rested.

## 2025-01-20 NOTE — PROGRESS NOTES
Called by RN to bedside due to right leg weakness.     Patient reports that she attempted to get up to use bathroom. She initially stated that her right leg felt weak but later clarified that it was not true weakness of the leg but rather that she would have pain in her groin when putting weight on her right leg. She felt significant pain and pressure in her right lower groin when putting weight onto her right lower extremity. Patient also reports some light-headedness/dizziness when ambulating.     Exam:  GEN: Pleasant female, in no acute distress, sitting up in bed.  HEENT: Normocephalic, atraumatic. Extraoccular eye movements intact. Anicteric sclera. Moist mucous membranes.   PULM: Non-labored breathing. No use of accessory muscles. Clear to ausculation bilaterally. No wheezes or crackles.   CV: Regular rate and rhythm. Normal S1, S2. No rubs, murmurs, or gallops.   ABDOMEN: Has tenderness to palpation in RLQ. Otherwise nontender. Uterus firm.   EXTREMITES:  Has bilateral 1+ lower extremity edema which patient states is slightly increased than previous   NEURO:  Awake. Oriented to person, place, time and situation. Cranial nerves 2-12 grossly intact. Moving all extremities. Bilateral lower extremities with 5/5 strength on knee flexion, knee extension, ankle dorsiflexion, ankle plantarflexion.   : Assessed external genitalia. Right perineal laceration with no significant surrounding erythema or swelling. No vaginal bleeding with uterine massage.   PSYCH: Calm. Appropriate affect, insight, judgment.     Assessment/Plan  Pain in groin with weightbearing on RLE. No true weakness appreciated on exam. Pain in right groin likely musculoskeletal from pushing/delivery or perineal laceration. Differential also includes adverse effect of epidural and orthostasis related to PPH/blood loss.    - Repeat vitals now and in 1 hour   - Recheck CBC   - Give Toradol for pain     Mirian Keyes MD, PGY-3  [facility] [place]  [facility]      Discussed with attending Dr Valle      Addendum  1:38 PM   Hgb stable from prior, now 8.7. WBC downtrending. Vitals stable. Continue to monitor.

## 2025-01-20 NOTE — PROGRESS NOTES
"Postpartum Day 0: Vaginal Delivery    Subjective:  The patient feels well. The baby is well. Breast feeding, going well.   The BERNA was removed by nursing and there was no signs of bleeding afterward  She did feel some cramping after removal       Objective   /63 (BP Location: Left arm, Patient Position: Semi-Gore's, Cuff Size: Adult Regular)   Pulse 105   Temp 98.9  F (37.2  C) (Oral)   Resp 18   Ht 1.6 m (5' 3\")   Wt 100.2 kg (221 lb)   LMP 2024 (Exact Date)   SpO2 99%   Breastfeeding Unknown   BMI 39.15 kg/m    General: in no acute distress  Skin: good color    Impression:   29 year old  female who delivered via spontaneous vaginal delivery at 37w6d, post-partum course complicated by stage 3 PPH with BERNA placement.  Currently doing well immediately after BERNA removal without any signs of ongoing bleeding.    Plan:   -continue current care, monitor  - trend labs Q6 overngiht      "

## 2025-01-20 NOTE — PROVIDER NOTIFICATION
"   01/19/25 2050   Daily Care   Activity Management sitting, edge of bed;standing at bedside;stepped/marched in place;back to bed  (she was able to slowly sit up in bed, dangle legs at edge of bed, she then stood at side of bed and then marched in place. she \"felt exhausted \"and then said \" I cant do anymore i should stop here\" she was helped back in to bed. see note)   Activity Assistance Provided assistance, 1 person   Symptoms Noted During/After Activity fatigue;gait unsteady   Hygiene Care   Bathing/Skin Care bath, partial;dressed/undressed;incontinence care;linen changed;moisturizer applied  (Rn helped wash niall area and she used soapy towels/ wet towel / dry towels to wash up her hands arms and face)   Perineal Care absorbent brief/pad changed;catheter care provided;diaper changed;perineal hygiene encouraged;perineum cleansed   Provider Notification   Provider Name/Title residents  ([employer] messaging requesting lama to be in until stable)             she was able to slowly sit up in bed, dangle legs at edge of bed, she then stood at side of bed and then marched in place. she \"felt exhausted\" and then said \" I cant do anymore i should stop here\" she was helped abck in to bed and writer helped wash her perineum/catheter care and change her underwear and pad and gave new linens while in her bed. her urine is dark colored and she is unsteady and exhausted. Will update provider and ask if lama catheter would remain in place   "

## 2025-01-20 NOTE — PROGRESS NOTES
1215 Patient called via bathroom call light, stating she has right leg weakness, and pain. Unable to lift her leg up. LLE has +2 pitting edema. Used sit to stand to get patient back to bed, with the assistance of 2 CNA's.  1220 Spoke with Mirian Keyes MD, requesting she comes and evaluate.Provider headed at bedside to evaluate.   Mehnaz Chino RN on 1/20/2025 at 12:22 PM

## 2025-01-20 NOTE — PLAN OF CARE
Problem: Adult Inpatient Plan of Care  Goal: Plan of Care Review  Description: The Plan of Care Review/Shift note should be completed every shift.  The Outcome Evaluation is a brief statement about your assessment that the patient is improving, declining, or no change.  This information will be displayed automatically on your shift  note.  Outcome: Progressing  Flowsheets (Taken 1/20/2025 1444)  Outcome Evaluation: Brien has stable vitals, she has some RLE pain, difficulty bearing weight on her Right leg. Patient reports pain improved after Toradol dose (see MAR). She has not been back out of bed, discussed needing a staff present when she gets up next. Brien and Jeffry verbalized understanding. Brien is working on breast feeding today, denying lactation assistance.  Plan of Care Reviewed With:   patient   significant other     Problem: Adult Inpatient Plan of Care  Goal: Optimal Comfort and Wellbeing  Outcome: Progressing  Intervention: Monitor Pain and Promote Comfort  Recent Flowsheet Documentation  Taken 1/20/2025 0956 by Mehnaz Chino RN  Pain Management Interventions: medication (see MAR)  Intervention: Provide Person-Centered Care  Recent Flowsheet Documentation  Taken 1/20/2025 0956 by Mehnaz Chino RN  Trust Relationship/Rapport:   care explained   choices provided   questions encouraged   questions answered   thoughts/feelings acknowledged   empathic listening provided     Problem: Adult Inpatient Plan of Care  Goal: Optimal Comfort and Wellbeing  Intervention: Provide Person-Centered Care  Recent Flowsheet Documentation  Taken 1/20/2025 0956 by Mehnaz Chino RN  Trust Relationship/Rapport:   care explained   choices provided   questions encouraged   questions answered   thoughts/feelings acknowledged   empathic listening provided     Problem: Labor  Goal: Acceptable Pain Control  Intervention: Support Labor Pain Coping and Management  Recent Flowsheet Documentation  Taken 1/20/2025 0956  by Mehnaz Chino RN  Sensory Stimulation Regulation:   care clustered   lighting decreased   quiet environment promoted     Problem: Postpartum (Vaginal Delivery)  Goal: Successful Parent Role Transition  Outcome: Progressing  Intervention: Support Parent Role Transition  Recent Flowsheet Documentation  Taken 1/20/2025 0956 by Mehnaz Chino, RN  Supportive Measures:   active listening utilized   decision-making supported   goal-setting facilitated   positive reinforcement provided   problem-solving facilitated   relaxation techniques promoted   self-care encouraged   verbalization of feelings encouraged  Parent-Child Attachment Promotion:   caring behavior modeled   cue recognition promoted   face-to-face positioning promoted   interaction encouraged   parent/caregiver presence encouraged   participation in care promoted   positive reinforcement provided   strengths emphasized  Goal: Hemostasis  Outcome: Progressing  Goal: Absence of Infection Signs and Symptoms  Outcome: Progressing  Intervention: Prevent or Manage Infection  Recent Flowsheet Documentation  Taken 1/20/2025 0956 by Mehnaz Chino RN  Infection Management: aseptic technique maintained  Goal: Anesthesia/Sedation Recovery  Outcome: Progressing  Goal: Optimal Pain Control and Function  Outcome: Progressing  Intervention: Prevent or Manage Pain  Recent Flowsheet Documentation  Taken 1/20/2025 1141 by Mehnaz Chino, RN  Perineal Care: perineal hygiene encouraged  Taken 1/20/2025 0956 by Mehnaz Chino, RN  Pain Management Interventions: medication (see MAR)  Goal: Effective Urinary Elimination  Outcome: Progressing  Intervention: Monitor and Manage Urinary Retention  Recent Flowsheet Documentation  Taken 1/20/2025 0956 by Mehnaz Chino, RN  Urinary Elimination Promotion:   frequent voiding encouraged   voiding relaxation promoted   Goal Outcome Evaluation:      Plan of Care Reviewed With: patient, significant other           Outcome Evaluation: Brien has stable vitals, she has some RLE pain, difficulty bearing weight on her Right leg. Patient reports pain improved after Toradol dose (see MAR). She has not been back out of bed, discussed needing a staff present when she gets up next. Brien and Jeffry verbalized understanding. Brien is working on breast feeding today, denying lactation assistance. Mehnaz Chino RN on 1/20/2025 at 2:49 PM

## 2025-01-20 NOTE — PROGRESS NOTES
"Postpartum Day 1: Vaginal Delivery    Subjective:  The patient feels well. The patient has no emotional concerns. Pain is well controlled with current medications. The patient is ambulating well. She is tolerating a normal diet, voiding, and passing flatus.The amount and color of the lochia is appropriate for the duration of recovery, patient denies clots. The baby is well. Breast and bottle feeding, going well. Reports some mild light-headedness on standing.     ROS: Patient reports no headaches, vision changes, chest pain, RUQ pain, lower extremity edema.      Objective   /51 (BP Location: Right arm)   Pulse 71   Temp 97.7  F (36.5  C) (Oral)   Resp 18   Ht 1.6 m (5' 3\")   Wt 94.5 kg (208 lb 4 oz)   LMP 2024 (Exact Date)   SpO2 98%   Breastfeeding Unknown   BMI 36.89 kg/m    General: in no acute distress  Abdomen: soft, NT, fundus below umbilicus and firm  : deferred  Ext: no edema  Skin: good color    Impression:   29 year old  female who delivered via spontaneous vaginal delivery at 37w6d, complicated by severe PPH requiring BERNA placement. BERNA s/p removal. QBL 2099 mL. Hgb 8.5, down from 11.4 on admission. Mostly feeling well apart from mild light-headedness on standing.    Plan:   -continue current care  -recheck hgb this afternoon and tomorrow AM    -anticipate home tomorrow  -f/u in 1 week    Mirian Keyes MD  [facility] [facility]   Pager #: [phone]    Precepted patient with Dr Dori Valle.    "

## 2025-01-20 NOTE — PLAN OF CARE
Dolores's vital and maternal assessment WDL. Bleeding has been scant since Lisa removal. Denies headache, vision changes, right epigastric pain, chest pain and SOB. Pain adequately managed with Tylenol and Ibuprofen, per patient preference. Perineal discomfort managed with Tucks, Dermoplast, ice pads and niall bottle. Utilizing mothers love nipple cream for prevention of nipple discomfort. Up independently with SBA, voiding without difficultly and caring for self and infant. I's and O's maintained d/t HTN protocol. Breastfeeding, a deep, and comfortable latch observed. Dolores and her supportive s/o Clemencia are attentive to infant needs/cues, asking appropriate questions and hold infant frequently. Positive attachment behaviors observed.     Problem: Adult Inpatient Plan of Care  Goal: Plan of Care Review  Description: The Plan of Care Review/Shift note should be completed every shift.  The Outcome Evaluation is a brief statement about your assessment that the patient is improving, declining, or no change.  This information will be displayed automatically on your shift  note.  Outcome: Progressing  Flowsheets (Taken 1/20/2025 0439)  Plan of Care Reviewed With:   patient   significant other  Overall Patient Progress: improving     Problem: Postpartum (Vaginal Delivery)  Goal: Effective Urinary Elimination  Outcome: Progressing  Intervention: Monitor and Manage Urinary Retention  Recent Flowsheet Documentation  Taken 1/19/2025 2336 by Dori Boudreaux RN  Urinary Elimination Promotion: frequent voiding encouraged     Problem: Postpartum (Vaginal Delivery)  Goal: Optimal Pain Control and Function  Outcome: Progressing  Intervention: Prevent or Manage Pain  Recent Flowsheet Documentation  Taken 1/20/2025 0310 by Dori Boudreaux, RN  Pain Management Interventions:   heat applied   care clustered   rest   quiet environment facilitated  Taken 1/20/2025 0224 by Dori Boudreaux, RN  Pain Management Interventions:    medication (see MAR)   heat applied   quiet environment facilitated   rest  Taken 1/19/2025 3086 by Dori Bodureaux, RN  Pain Management Interventions:   care clustered   quiet environment facilitated   rest  Perineal Care: perineal hygiene encouraged     Problem: Hypertension Acute  Goal: Blood Pressure Within Desired Range  Outcome: Progressing   Goal Outcome Evaluation:      Plan of Care Reviewed With: patient, significant other    Overall Patient Progress: improvingOverall Patient Progress: improving

## 2025-01-21 ENCOUNTER — APPOINTMENT (OUTPATIENT)
Dept: RADIOLOGY | Facility: HOSPITAL | Age: 30
End: 2025-01-21
Payer: MEDICAID

## 2025-01-21 ENCOUNTER — APPOINTMENT (OUTPATIENT)
Dept: PHYSICAL THERAPY | Facility: HOSPITAL | Age: 30
End: 2025-01-21
Payer: MEDICAID

## 2025-01-21 LAB
HGB BLD-MCNC: 8.9 G/DL (ref 11.7–15.7)
HOLD SPECIMEN: NORMAL
HOLD SPECIMEN: NORMAL

## 2025-01-21 PROCEDURE — 99207 PR NO CHARGE LOS: CPT | Mod: GC | Performed by: STUDENT IN AN ORGANIZED HEALTH CARE EDUCATION/TRAINING PROGRAM

## 2025-01-21 PROCEDURE — 97530 THERAPEUTIC ACTIVITIES: CPT | Mod: GP

## 2025-01-21 PROCEDURE — 120N000013 HC R&B IMCU

## 2025-01-21 PROCEDURE — 250N000013 HC RX MED GY IP 250 OP 250 PS 637

## 2025-01-21 PROCEDURE — 85018 HEMOGLOBIN: CPT

## 2025-01-21 PROCEDURE — 36415 COLL VENOUS BLD VENIPUNCTURE: CPT

## 2025-01-21 PROCEDURE — 250N000011 HC RX IP 250 OP 636

## 2025-01-21 PROCEDURE — 72170 X-RAY EXAM OF PELVIS: CPT

## 2025-01-21 PROCEDURE — 97161 PT EVAL LOW COMPLEX 20 MIN: CPT | Mod: GP

## 2025-01-21 RX ADMIN — KETOROLAC TROMETHAMINE 15 MG: 30 INJECTION, SOLUTION INTRAMUSCULAR at 20:35

## 2025-01-21 RX ADMIN — KETOROLAC TROMETHAMINE 15 MG: 30 INJECTION, SOLUTION INTRAMUSCULAR at 02:02

## 2025-01-21 RX ADMIN — KETOROLAC TROMETHAMINE 15 MG: 30 INJECTION, SOLUTION INTRAMUSCULAR at 14:26

## 2025-01-21 RX ADMIN — ACETAMINOPHEN 975 MG: 325 TABLET ORAL at 08:10

## 2025-01-21 RX ADMIN — ACETAMINOPHEN 975 MG: 325 TABLET ORAL at 20:35

## 2025-01-21 RX ADMIN — ACETAMINOPHEN 975 MG: 325 TABLET ORAL at 14:26

## 2025-01-21 RX ADMIN — KETOROLAC TROMETHAMINE 15 MG: 30 INJECTION, SOLUTION INTRAMUSCULAR at 08:10

## 2025-01-21 RX ADMIN — ACETAMINOPHEN 975 MG: 325 TABLET ORAL at 00:03

## 2025-01-21 RX ADMIN — DOCUSATE SODIUM 100 MG: 100 CAPSULE, LIQUID FILLED ORAL at 08:10

## 2025-01-21 ASSESSMENT — ACTIVITIES OF DAILY LIVING (ADL)
ADLS_ACUITY_SCORE: 35
ADLS_ACUITY_SCORE: 37
ADLS_ACUITY_SCORE: 35
ADLS_ACUITY_SCORE: 37
ADLS_ACUITY_SCORE: 35
ADLS_ACUITY_SCORE: 37
ADLS_ACUITY_SCORE: 35
ADLS_ACUITY_SCORE: 37
ADLS_ACUITY_SCORE: 35

## 2025-01-21 NOTE — PLAN OF CARE
Physical Therapy Discharge Summary    Reason for therapy discharge:    All goals and outcomes met, no further needs identified.    Progress towards therapy goal(s). See goals on Care Plan in [facility] electronic health record for goal details.  Goals met    Therapy recommendation(s):    Continued therapy is recommended.  Rationale/Recommendations:  outpatient PT. Order placed.

## 2025-01-21 NOTE — PROGRESS NOTES
"Postpartum Day 2: Vaginal Delivery    Subjective:  The patient feels well. The patient has no emotional concerns. Pain is well controlled with current medications (tylenol, ibuprofen, toradol). The patient is ambulating well. She is tolerating a normal diet, voiding, and passing flatus.The amount and color of the lochia is appropriate for the duration of recovery, patient denies clots. The baby is well. Breast and bottle feeding, going well. Reports some mild light-headedness on standing. Since last night, patient has been reporting significant difficulty with ambulating. States she cannot walk normally because it causes significant pain over her pubic bone. She has to side-step/shuffle with support to get around her room.    ROS: Patient reports no headaches, vision changes, chest pain, RUQ pain. Lower extremity edema present, stable from prior to delivery per patient.   Objective   /71 (BP Location: Left arm)   Pulse 88   Temp 98  F (36.7  C) (Oral)   Resp 18   Ht 1.6 m (5' 3\")   Wt 90.6 kg (199 lb 11.2 oz)   LMP 2024 (Exact Date)   SpO2 100%   Breastfeeding Unknown   BMI 35.38 kg/m    General: in no acute distress  Abdomen: soft, NT, fundus below umbilicus and firm, reports pain to palpation over midline pubic bone  : deferred  Ext: 1+ peripheral edema in bilateral lower extremities  Skin: good color    Impression:   29 year old  female who delivered via spontaneous vaginal delivery at 37w6d, complicated by severe PPH requiring BERNA placement. BERNA s/p removal. QBL 2099 mL. Hgb 8.9, down from 11.4 on admission but stable from yesterday. Has significant bilateral lower extremity edema which she states is stable from prior to delivery but will assess urine protein:cr ratio. Patient reports significant difficulty ambulating due to midline pain over pubic bone; will assess with XR.    Plan:   -continue current care  -pelvis XR (if normal, consider PT evaluation)   -urine protein " creatinine ratio (due to persistent edema)  -anticipate home tomorrow  -f/u in 1 week    Mirian Keyes MD  [facility] [facility]     Precepted patient with Dr Thomas.

## 2025-01-21 NOTE — PLAN OF CARE
Goal Outcome Evaluation:      Plan of Care Reviewed With: patient    Overall Patient Progress: improving    Outcome Evaluation: VSS. Blood pressures WNL this shift.    VSS. Fundus firm. Hgb 8.9 today. Blood pressures WNL this shift. Pt denied all s/s of preeclampsia throughout the shift. No clonus. Pt is breastfeeding and started supplementation with formula this shift and began pumping using hospital breast pump. The pt breast fed 1x this shift with lactation consultant, otherwise pt has been supplementing with formula and pumping. Pt reports tolerable pain control with tylenol and toradol use. Pt is ambulating independently in the room but c/o difficulty walking d/t pelvic pain/weakness that is ongoing since delivery. Pelvic x-ray completed, PT consult placed.

## 2025-01-21 NOTE — LACTATION NOTE
This note was copied from a baby's chart.  Initial Lactation Visit    Current age: 47 hours old    Gestational age at delivery: 37w6d     Diaper count: 4 wet 2 soiled Meconium, last stool was yesterday     Feedings: 10 breast  0 supplement     Weight Loss: 8.5% at 46 hours    Breastfeeding goals:6-12 months    Past breastfeeding experience: first child    Maternal health risk that may affect breastfeeding:QBL >1000    Home Pump: Zomee    Breast assessment: Breast: Round, Symmetrical, and Soft ,  Nipples: Everted with stimulation and Tender    Infant oral assessment: Oral: Midline and Elevated, Suck: Uncoordinated    Feeding assessment:  Mother had infant on right breast in traditional hold, scissor hold on nipple, infant was crying. Mother able to get infant to latch but infant frantic, infant does enter into a rhythmic sucking pattern, but minimal swallows observed. Infant pulling at the breast coming off breast crying. This was the second side infant was on, so moved to paced bottle feeding and pumping. Infant disorganized with the bottle, clicking with the tongue. Side lying with some cheek support infant able to bottle 5ml. Father than attempted bottling, but Father not able to get infant to bottle further. LC than able to get infant to bottle an additional 5ml.   Mother pumped using a 21mm flange and collected 0.4ml.     Feeding plan: Feed every 2-3 hours. 10-15 minutes per side, if infant becomes frustrated at the breast offer 5ml from bottle than retry the latch. Pump for 15 minutes and bottle 5-15ml. May do speed rounds of focusing on pumping and bottling to maximize parents rest.   If after next bottle feeding infant continues to be disorganized with bottle, than consider OT consult.     Education:   [x] Expected  feeding patterns in the first few days (pg. 38 of Your Guide to To Postpartum and  Care)/ the Second Night  [x] Stages of milk production  [] Hand expression   [x] Feeding  cues     [] Benefits of skin to skin  [x] Breastfeeding positions  [x] Tips to get and maintain a deep latch  [] Usage of nipple shield  [x] Nutritive vs.non-nutritive sucking  [x] Gentle breast compressions as needed  [] How to tell when baby is finished  [x] Supplementation  [x] Paced bottle feeding  [] Spoon/cup feeding  [] LPI feeding patterns  [] LBW feeding patterns  [x] Expected  output  [x] Winter weight loss  [] Infant Feeding Log  [x] Overall goals/How to know baby is getting enough  [] Calming techniques  [] Engorgement/Reverse Pressure Softening  [x] Pumping  [x] Breastpump education  [x] Inpatient breastfeeding support  [] Outpatient lactation resources    Follow up: Will follow up tomorrow, , for further education and support.

## 2025-01-21 NOTE — PROGRESS NOTES
[id number] [phone]   Appointment Info   Signing Clinician's Name / Credentials (PT) Marie Garcia DPT   Living Environment   People in Home spouse   Current Living Arrangements house   Home Accessibility stairs within home   Number of Stairs, Within Home, Primary five   Stair Railings, Within Home, Primary railings safe and in good condition   Self-Care   Usual Activity Tolerance excellent   Current Activity Tolerance good   Equipment Currently Used at Home none   Fall history within last six months no   General Information   Onset of Illness/Injury or Date of Surgery 25   Referring Physician Mirian Keyes MD   Patient/Family Therapy Goals Statement (PT) no pain   Pertinent History of Current Problem (include personal factors and/or comorbidities that impact the POC) 29 year old  female who delivered via spontaneous vaginal delivery at 37w6d, complicated by severe PPH requiring BERNA placement. BERNA s/p removal. QBL 2099 mL. Hgb 8.9, down from 11.4 on admission but stable from yesterday. Has significant bilateral lower extremity edema which she states is stable from prior to delivery but will assess urine protein:cr ratio. Patient reports significant difficulty ambulating due to midline pain over pubic bone; will assess with XR.   Cognition   Affect/Mental Status (Cognition) WFL   Pain Assessment   Patient Currently in Pain Yes, see Vital Sign flowsheet  (1/10)   Range of Motion (ROM)   Range of Motion ROM deficits secondary to pain   Strength (Manual Muscle Testing)   Strength (Manual Muscle Testing) strength is WFL   Bed Mobility   Bed Mobility supine-sit   Supine-Sit [place] (Bed Mobility) supervision;modified independence  (with leg )   Assistive Device (Bed Mobility) leg    Comment, (Bed Mobility) inc assist from leg  to get legs off edge of bed, pt reports inc ability to perform task with less pain   Transfers   Transfers sit-stand transfer   Sit-Stand Transfer    Sit-Stand [place] (Transfers) supervision   Comment, (Sit-Stand Transfer) slow   Gait/Stairs (Locomotion)   [place] Level (Gait) supervision   Assistive Device (Gait)   (none)   Distance in Feet (Gait) 5   Comment, (Gait/Stairs) slides feet forward, too much pain to lift either leg during amb (then rates pain at 1/10)   Balance   Balance Comments static standing ind   Clinical Impression   Criteria for Skilled Therapeutic Intervention Yes, treatment indicated   PT Diagnosis (PT) impaired functional mobility, gait abnormality   Influenced by the following impairments decreased strength, decreased endurance   Functional limitations due to impairments gait, transfers, bed mob   Clinical Presentation (PT Evaluation Complexity) stable   Clinical Presentation Rationale pt presents as medically diagnosed   Clinical Decision Making (Complexity) low complexity   Planned Therapy Interventions (PT) balance training;bed mobility training;gait training;home exercise program;neuromuscular re-education;patient/family education;strengthening;transfer training;stair training   Risk & Benefits of therapy have been explained evaluation/treatment results reviewed;patient   PT Total Evaluation Time   PT Eval, Low Complexity Minutes (47995) 10   Physical Therapy Goals   PT Frequency One time eval and treatment only   PT Predicted Duration/Target Date for Goal Attainment 01/28/25   PT Goals Bed Mobility;Transfers;Gait   PT: Bed Mobility Modified independent;Supine to/from sit;Goal Met;Completed   PT: Transfers Modified independent;Sit to/from stand;Goal Met;Completed   PT: Gait Independent;5 feet   Interventions   Interventions Quick Adds Therapeutic Activity   Therapeutic Activity   Therapeutic Activities: dynamic activities to improve functional performance Minutes (90230) 25   Symptoms Noted During/After Treatment Increased pain   Treatment Detail/Skilled Intervention Inc time spent discussing various methods for functional  mobility. Initially discussed need for outpatient PT pelvic floor to assist with pubic symphysis separation. Next discussed need for SI belt. For the time being, instructed pt to use gait belt around greater trochanters to assist in holding pelvis together more. Pt reports increased comfort with mobility. Sit <> stand education for kicking RLE forward to prevent inc pain. Educated on holding onto sturdy object and performing hip flexion bilaterally in safe manner, as able, to assist with clearing feet during gait. Educated on glut sets to assist strenghtening around pelvis. Also discussed need to complete stairs safely, to never carry the baby up and down stairs while in pain (in carrier or without), to always use a railing and to have husbands support, and for non reciprocal up with good down with bad. Pt reports understanding of all instructions, will reach out to primary team should pt have further questions from PT standpoint. Order has been placed for outpatient physical therapy, pt declined need for issuing of walker.   PT Discharge Planning   PT Plan dc PT   PT Discharge Recommendation (DC Rec) home with assist;home with outpatient physical therapy   PT Rationale for DC Rec order placed for outpatient PT   PT Brief overview of current status SBA, inc pain with mobility   PT Total Distance Amb During Session (feet) 5   PT Equipment Needed at Discharge other (see comments)  (none)   Physical Therapy Time and Intention   Timed Code Treatment Minutes 25   Total Session Time (sum of timed and untimed services) 35

## 2025-01-21 NOTE — PLAN OF CARE
Goal Outcome Evaluation:      Plan of Care Reviewed With: patient    Overall Patient Progress: improving    Outcome Evaluation: Patients VSS, voiding, ambulating, reporting pain 3/10, tylenol and toradol given. Breastfeeding independently, also using warm packs for abdominal pain.       Mounika Alva RN on 1/21/2025 at 2:13 AM

## 2025-01-21 NOTE — DISCHARGE SUMMARY
OB Discharge Summary  Date:  2025    Name:  Dolores Box  :  1995  MRN:  [id number]    Admission Date:  2025  Delivery Date:  2025  8:49 AM  Gestational Age at Delivery:  37w6d  Discharge Date:  25    Principal Diagnosis:    Problem List Items Addressed This Visit       Normal labor and delivery - Primary    Relevant Medications    acetaminophen (TYLENOL) 325 MG tablet    ibuprofen (ADVIL/MOTRIN) 800 MG tablet    Other Relevant Orders    Breast pump - Manual/Electric    Physical Therapy  Referral     Other Diagnosis:    Postpartum hemorrhage, severe  Pubic symphysis separation     Conditions complicating Pregnancy: none    Procedure(s) Performed:       Condition at Discharge:  good    Summary of Hospitalization:  Dolores Box is a 29 year old  female at 37w6d who presented to [facility] on 25 with PROM. Prenatal course was uncomplicated. She received oral misoprostol (Cytotec) and IV pitocin for induction/augmentation. She received an epidural for analgesia. Patient was fully dilated and pushing after 4 hour in active labor. Second stage of labor was 80 minutes. Successful . Complicated by severe PPH. Patient was given pitocin, misoprostol, methergine, TXA. Ultimately required BERNA placement by in-house OB/GYN. QBL 2099 mL.     Hospital course complicated by significant pain over pubic symphysis and difficulty ambulating. Pelvic XR shows mild widening of symphysis pubis, no fracture. Worked with PT, who recommended outpatient PT referral, which was ordered.    On the day of discharge:   Patient reports that she is feeling well. Pain is well-controlled with Tylenol, ibuprofen. The amount and color of the lochia is appropriate for the duration of recovery; patient denies clots. Tolerating a normal diet. Urinating and stooling normally. No emotional concerns. Patient states that breast feeding is now going well. Bonding  "well with infant. Hgb stable, 8.9 on postpartum day 2.     OB History    Para Term  AB Living   1 1 1 0 0 1   SAB IAB Ectopic Multiple Live Births   0 0 0 0 1      # Outcome Date GA Lbr Donte/2nd Weight Sex Type Anes PTL Lv   1 Term 25 37w6d / 01:20 3.35 kg (7 lb 6.2 oz) F Vag-Spont EPI, Nitrous N ADELAIDA      Name: Female-Dolores Box      Apgar1: 9  Apgar5: 9       Examination:  /69 (BP Location: Right arm)   Pulse 81   Temp 97.7  F (36.5  C) (Oral)   Resp 20   Ht 1.6 m (5' 3\")   Wt 96.2 kg (212 lb)   LMP 2024 (Exact Date)   SpO2 99%   Breastfeeding Unknown   BMI 37.55 kg/m     Gen: alert, normal interactions and behaviors  Cor: RRR, no murmurs/rubs/gallops  Lungs: CTAB, no wheezes or crackles   ABD: Fundus firm below umbilicus, active bowel sounds  Ext: No lower extremity edema, 2+ peripheral pulses    Discharge Plan:   Follow up with Dr. Lott in 1 week   Contraception Plan: condoms   Patient Instructions:     Physical activity: normal   Diet: normal   Medication: tylenol, ibuprofen as needed     Mirian Keyes MD  [facility]       Precepted patient with Dr. Syeda Thomas.    "

## 2025-01-21 NOTE — PLAN OF CARE
Goal Outcome Evaluation: Progressing       Plan of Care Reviewed With: patient    Overall Patient Progress: improvingOverall Patient Progress: improving    Outcome Evaluation: Brien's VS and PP assessments WDL.  She is able to slide across the floor sideways; still unable to ambulate normally.  (See previous notes.) Brien is voiding without difficulty; breastfeeding every 2-3 hours; baby has a good latch; Brien's nipples are intact and comfortable.  Her  is in the room and is supportive and involved in caring for baby. Brien is taking Tylenol and Toradol for symphysis pubis pain she rates 4-5.      Problem: Postpartum (Vaginal Delivery)  Goal: Successful Parent Role Transition  Outcome: Progressing  Intervention: Support Parent Role Transition  Recent Flowsheet Documentation  Taken 1/20/2025 1607 by Belkys Busch RN  Supportive Measures:   active listening utilized   counseling provided   decision-making supported   problem-solving facilitated   self-care encouraged   positive reinforcement provided  Parent-Child Attachment Promotion:   caring behavior modeled   cue recognition promoted   face-to-face positioning promoted   interaction encouraged   participation in care promoted   positive reinforcement provided   skin-to-skin contact encouraged     Problem: Postpartum (Vaginal Delivery)  Goal: Anesthesia/Sedation Recovery  Outcome: Met     Problem: Postpartum (Vaginal Delivery)  Goal: Effective Urinary Elimination  Outcome: Met

## 2025-01-21 NOTE — PROVIDER NOTIFICATION
Notified Dr. Keyes that the pt's x-ray result is back.     Per Dr. Keyes: PT consult ordered. No additional interventions needed.

## 2025-01-21 NOTE — PROGRESS NOTES
Dr. Alva was contacted about patient's inability to ambulate due to pubis symphysis pain.  She is able to get out of bed and stand, but cannot lift either leg/foot to ambulate.  Dr. Alva will come and evaluate.

## 2025-01-21 NOTE — LACTATION NOTE
Called back into room per Family request d/t questions regarding how long ready feed formula is good for. Mother was pumping and Father pace bottle feeding infant. Infant does start off more chompy on bottle, than will progress to sucking, continues to need pacing.

## 2025-01-21 NOTE — PROGRESS NOTES
"10:12 PM  Brief Progress Note    Notified by RN regarding pubic symphysis pain. Patient notes that R groin pain from earlier now resolved and has migrated back to the center. Difficult to move RLE, LLE feels \"a little more free.\" Having trouble ambulating. Improved d/t tylenol and ibuprofen, but doesn't seem as helpful as toradol from earlier.     /70 (BP Location: Right arm, Patient Position: Semi-Gore's, Cuff Size: Adult Regular)   Pulse 74   Temp 97.9  F (36.6  C) (Oral)   Resp 18   Ht 1.6 m (5' 3\")   Wt 94.5 kg (208 lb 4 oz)   LMP 04/24/2024 (Exact Date)   SpO2 99%   Breastfeeding Unknown   BMI 36.89 kg/m      GEN: Appears well, NAD, conversing appropriately  MSK: BLE strength 5/5, wiggling toes, extension/flexion intact, RLE adduction limited d/t pain, LLE adduction intact.   NEURO: BLE sensation grossly normal.   PSYCH: Appropriate mood/affect.     AP:  No significant change in physical exam of bilateral lower extremities when compared to previous done by Dr. Keyes, which is reassuring.  Likely this is poorly controlled pain related to right groin strain and perineal laceration.  -Tylenol increased to 975 3 times daily  -Added on Toradol 15 mg IV every 6 hours as needed  -Encouraged continued use of ice pack if helpful  -Assist as needed when ambulating/transferring    Eron Alva MD PGY2  [facility]       "

## 2025-01-22 VITALS
OXYGEN SATURATION: 99 % | SYSTOLIC BLOOD PRESSURE: 119 MMHG | RESPIRATION RATE: 20 BRPM | WEIGHT: 212 LBS | HEIGHT: 63 IN | BODY MASS INDEX: 37.56 KG/M2 | HEART RATE: 81 BPM | DIASTOLIC BLOOD PRESSURE: 69 MMHG | TEMPERATURE: 97.7 F

## 2025-01-22 PROCEDURE — 90707 MMR VACCINE SC: CPT

## 2025-01-22 PROCEDURE — 250N000013 HC RX MED GY IP 250 OP 250 PS 637

## 2025-01-22 PROCEDURE — 250N000011 HC RX IP 250 OP 636

## 2025-01-22 PROCEDURE — 90471 IMMUNIZATION ADMIN: CPT

## 2025-01-22 PROCEDURE — 99207 PR NO CHARGE LOS: CPT | Mod: GC | Performed by: STUDENT IN AN ORGANIZED HEALTH CARE EDUCATION/TRAINING PROGRAM

## 2025-01-22 RX ORDER — IBUPROFEN 800 MG/1
800 TABLET, FILM COATED ORAL EVERY 6 HOURS PRN
Qty: 50 TABLET | Refills: 0 | Status: SHIPPED | OUTPATIENT
Start: 2025-01-22

## 2025-01-22 RX ORDER — ACETAMINOPHEN 325 MG/1
975 TABLET ORAL EVERY 8 HOURS PRN
Qty: 50 TABLET | Refills: 0 | Status: SHIPPED | OUTPATIENT
Start: 2025-01-22

## 2025-01-22 RX ADMIN — DOCUSATE SODIUM 100 MG: 100 CAPSULE, LIQUID FILLED ORAL at 08:03

## 2025-01-22 RX ADMIN — MEASLES, MUMPS, AND RUBELLA VIRUS VACCINE LIVE 0.5 ML: 1000; 12500; 1000 INJECTION, POWDER, LYOPHILIZED, FOR SUSPENSION SUBCUTANEOUS at 14:56

## 2025-01-22 RX ADMIN — ACETAMINOPHEN 975 MG: 325 TABLET ORAL at 08:03

## 2025-01-22 RX ADMIN — IBUPROFEN 800 MG: 800 TABLET ORAL at 14:30

## 2025-01-22 RX ADMIN — IBUPROFEN 800 MG: 800 TABLET ORAL at 03:04

## 2025-01-22 RX ADMIN — ACETAMINOPHEN 975 MG: 325 TABLET ORAL at 14:30

## 2025-01-22 RX ADMIN — IBUPROFEN 800 MG: 800 TABLET ORAL at 08:03

## 2025-01-22 RX ADMIN — ACETAMINOPHEN 975 MG: 325 TABLET ORAL at 03:04

## 2025-01-22 ASSESSMENT — ACTIVITIES OF DAILY LIVING (ADL)
ADLS_ACUITY_SCORE: 35

## 2025-01-22 NOTE — PLAN OF CARE
Goal Outcome Evaluation: Progressing      Plan of Care Reviewed With: patient, spouse    Overall Patient Progress: improvingOverall Patient Progress: improving    Outcome Evaluation: Brien's VS and PP assessments stable and WDL.  She's shuffling and sliding sideways to ambulate.  PT saw her today.  She was able to get in and out of the tub with help; said soaking felt so good.  She was encouraged to soak 2  X daily for several days after going home.  She's taken Tylenol and Toradol for sympysis pubis pain this shift.  At next administration, she'd like to try Ibuprofen.  Dolores is breastfeeding and supplementing with formula.  Both are going well.  OT saw baby today and recommended Dr. Diaz's #1 nipple which they are using.    Problem: Postpartum (Vaginal Delivery)  Goal: Successful Parent Role Transition  Outcome: Progressing  Intervention: Support Parent Role Transition  Recent Flowsheet Documentation  Taken 1/21/2025 1645 by Belkys Busch RN  Supportive Measures:   active listening utilized   counseling provided   decision-making supported   positive reinforcement provided   problem-solving facilitated   self-care encouraged  Parent-Child Attachment Promotion:   caring behavior modeled   cue recognition promoted   face-to-face positioning promoted   participation in care promoted   positive reinforcement provided     Problem: Hypertension Acute  Goal: Blood Pressure Within Desired Range  Outcome: Progressing  Intervention: Normalize Blood Pressure  Recent Flowsheet Documentation  Taken 1/21/2025 1645 by Belkys Busch RN  Sensory Stimulation Regulation: care clustered

## 2025-01-22 NOTE — PLAN OF CARE
"  Problem: Adult Inpatient Plan of Care  Goal: Plan of Care Review  Description: The Plan of Care Review/Shift note should be completed every shift.  The Outcome Evaluation is a brief statement about your assessment that the patient is improving, declining, or no change.  This information will be displayed automatically on your shift  note.  Outcome: Met  Flowsheets (Taken 1/22/2025 1627)  Plan of Care Reviewed With: patient  Overall Patient Progress: improving  Goal: Patient-Specific Goal (Individualized)  Description: You can add care plan individualizations to a care plan. Examples of Individualization might be:  \"Parent requests to be called daily at 9am for status\", \"I have a hard time hearing out of my right ear\", or \"Do not touch me to wake me up as it startles  me\".  Outcome: Met  Goal: Absence of Hospital-Acquired Illness or Injury  Outcome: Met  Intervention: Prevent Skin Injury  Recent Flowsheet Documentation  Taken 1/22/2025 0800 by Epifanio Zuleta RN  Body Position: position changed independently  Intervention: Prevent and Manage VTE (Venous Thromboembolism) Risk  Recent Flowsheet Documentation  Taken 1/22/2025 0800 by Epifanio Zuleta RN  VTE Prevention/Management: (ambulating and hydrating well) --  Intervention: Prevent Infection  Recent Flowsheet Documentation  Taken 1/22/2025 0800 by Epifanio Zuleta RN  Infection Prevention:   cohorting utilized   environmental surveillance performed  Goal: Optimal Comfort and Wellbeing  Outcome: Met  Intervention: Provide Person-Centered Care  Recent Flowsheet Documentation  Taken 1/22/2025 0800 by Epifanio Zuleta RN  Trust Relationship/Rapport:   care explained   choices provided   empathic listening provided   questions answered   questions encouraged  Goal: Readiness for Transition of Care  Outcome: Met     Problem: Postpartum (Vaginal Delivery)  Goal: Successful Parent Role Transition  Outcome: Met  Intervention: Support Parent Role " Transition  Recent Flowsheet Documentation  Taken 2025 08 by Epifanio Zuleta RN  Supportive Measures:   active listening utilized   counseling provided   decision-making supported   positive reinforcement provided   problem-solving facilitated   self-care encouraged  Parent-Child Attachment Promotion:   caring behavior modeled   cue recognition promoted   face-to-face positioning promoted   participation in care promoted   positive reinforcement provided  Goal: Hemostasis  Outcome: Met  Goal: Absence of Infection Signs and Symptoms  Outcome: Met  Intervention: Prevent or Manage Infection  Recent Flowsheet Documentation  Taken 2025 08 by Epifanio Zuleta RN  Infection Management: aseptic technique maintained  Goal: Optimal Pain Control and Function  Outcome: Met  Intervention: Prevent or Manage Pain  Recent Flowsheet Documentation  Taken 2025 by Epifanio Zuleta RN  Perineal Care:   absorbent brief/pad changed   perineal spray bottle/warm water use encouraged   perineal hygiene encouraged     Problem: Hypertension Acute  Goal: Blood Pressure Within Desired Range  Outcome: Met  Intervention: Normalize Blood Pressure  Recent Flowsheet Documentation  Taken 2025 by Epifanio Zuleta RN  Sensory Stimulation Regulation: care clustered   Goal Outcome Evaluation:      Plan of Care Reviewed With: patient    Overall Patient Progress: improvingOverall Patient Progress: improving       D:  Patient desires discharge home.  Discharge orders received and entered by provider.  A:  Discharge instructions reviewed with the patient.  All questions and concerns addressed.  R:  Discharge criteria met.  4 Part ID bands double checked.  [place] discharged in car seat with Parents. The NA escorted patient to car.

## 2025-01-22 NOTE — LACTATION NOTE
This note was copied from a baby's chart.  Follow up Lactation Visit    Current age : 72 hours old    Gestational age at delivery: 37w6d     In the last 24 hours: Diaper count: 4 wet 4 soiled    Feedings: 6 breast 5 Formula 20k/amalia 10-30mL    Weight Loss: 8.24%    Breastfeeding goals:6-12 months    Maternal health risk that may affect breastfeeding: QBL >1000, Primip    Home Pump: Zomee    Feeding plan: Skin-to-skin prior to feedings. Continue breastfeeding and/or bottle feeding on-demand and/or every 2-3 hours. Increase feeding volumes with days of life. Track feedings and diaper output. Encourage mother to pump for supplements given.     Education:   [x] Expected  feeding patterns in the first few days (pg. 38 of Your Guide to To Postpartum and [place] Care)/ the Second Night  [x] Stages of milk production  [x] Hand expression   [x] Feeding cues     [x] Benefits of skin to skin  [] Breastfeeding positions  [x] Tips to get and maintain a deep latch  [] Usage of nipple shield  [] Nutritive vs.non-nutritive sucking  [x] Gentle breast compressions as needed  [x] How to tell when baby is finished  [x] Supplementation  [x] Paced bottle feeding  [] Spoon/cup feeding  [] LPI feeding patterns  [] LBW feeding patterns  [x] Expected  output  [x] [place] weight loss  [x] Overall goals/How to know baby is getting enough  [x] Infant Feeding Log  [x] Calming techniques  [x] Engorgement/Reverse Pressure Softening  [x] Pumping  [x] Breastpump education  [] Inpatient breastfeeding support  [x] Outpatient lactation resources    Follow up: Will follow up as desired.

## 2025-01-22 NOTE — DISCHARGE INSTRUCTIONS
Warning Signs after Having a Baby    Keep this paper on your fridge or somewhere else where you can see it.    Call your provider if you have any of these symptoms up to 12 weeks after having your baby.    Thoughts of hurting yourself or your baby  Pain in your chest or trouble breathing  Severe headache not helped by pain medicine  Eyesight concerns (blurry vision, seeing spots or flashes of light, other changes to eyesight)  Fainting, shaking or other signs of a seizure    Call 9-1-1 if you feel that it is an emergency.     The symptoms below can happen to anyone after giving birth. They can be very serious. Call your provider if you have any of these warning signs.    My provider s phone number: _______________________    Losing too much blood (hemorrhage)    Call your provider if you soak through a pad in less than an hour or pass blood clots bigger than a golf ball. These may be signs that you are bleeding too much.    Blood clots in the legs or lungs    After you give birth, your body naturally clots its blood to help prevent blood loss. Sometimes this increased clotting can happen in other areas of the body, like the legs or lungs. This can block your blood flow and be very dangerous.     Call your provider if you:  Have a red, swollen spot on the back of your leg that is warm or painful when you touch it.   Are coughing up blood.     Infection    Call your provider if you have any of these symptoms:  Fever of 100.4 F (38 C) or higher.  Pain or redness around your stitches if you had an incision.   Any yellow, white, or green fluid coming from places where you had stitches or surgery.    Mood Problems (postpartum depression)    Many people feel sad or have mood changes after having a baby. But for some people, these mood swings are worse.     Call your provider right away if you feel so anxious or nervous that you can't care for yourself or your baby.    Preeclampsia (high blood pressure)    Even if you  didn't have high blood pressure when you were pregnant, you are at risk for the high blood pressure disease called preeclampsia. This risk can last up to 12 weeks after giving birth.     Call your provider if you have:   Pain on your right side under your rib cage  Sudden swelling in the hands and face    Remember: You know your body. If something doesn't feel right, get medical help.     For informational purposes only. Not to replace the advice of your health care provider. Copyright 2020 [facility]. All rights reserved. Clinically reviewed by Penny Diallo, RNC-OB, MSN. [employer] [id number] - Rev 02/23.

## 2025-01-22 NOTE — PLAN OF CARE
Problem: Adult Inpatient Plan of Care  Goal: Plan of Care Review  Description: The Plan of Care Review/Shift note should be completed every shift.  The Outcome Evaluation is a brief statement about your assessment that the patient is improving, declining, or no change.  This information will be displayed automatically on your shift  note.  Outcome: Progressing  Flowsheets (Taken 1/22/2025 0525)  Plan of Care Reviewed With:   patient   significant other  Overall Patient Progress: improving     Problem: Labor  Goal: Acceptable Pain Control  Intervention: Support Labor Pain Coping and Management  Recent Flowsheet Documentation  Taken 1/22/2025 0000 by Princess Hollingsworth RN  Sensory Stimulation Regulation: care clustered     Problem: Postpartum (Vaginal Delivery)  Goal: Optimal Pain Control and Function  Outcome: Progressing  Intervention: Prevent or Manage Pain  Recent Flowsheet Documentation  Taken 1/22/2025 0000 by Princess Hollingsworth RN  Perineal Care:   absorbent brief/pad changed   perineal spray bottle/warm water use encouraged   perineal hygiene encouraged   Goal Outcome Evaluation:      Plan of Care Reviewed With: patient, significant other    Overall Patient Progress: improvingOverall Patient Progress: improving   Sharonda's Vitals and post-partum assessment are within normal limit except intermittent pain at her symphysis pubis. She rates pain 5/10, well managed with tylenol 975 mg and Motrin 800 mg every 6 hours. She decided to take Motrin instead of Toradol. She continue to alternate cold and heat packs for comfort. She is able to ambulate to the bathroom by shuffling and sliding sideways with assist of 1. Patient is breastfeeding independently well and supplementing with formula. Significant other at her bedside. PT saw pt during the day. Will continue with current plan of care. Notify provider with any concern Princess Hollingsworth RN

## 2025-01-23 ENCOUNTER — PATIENT OUTREACH (OUTPATIENT)
Dept: CARE COORDINATION | Facility: CLINIC | Age: 30
End: 2025-01-23
Payer: MEDICAID

## 2025-01-23 NOTE — PROGRESS NOTES
"Addendum to discharge summary:     -Acute Blood Loss Anemia  -Other (please specify)     Clinical Indicators found within the medical record:     Discharge Summary by Mirian Keyes at 2025 07:56  \"Summary of Hospitalization:  Dolores Box is a 29 year old  female at 37w6d who presented to [facility] on 25 with PROM.\"    \"Complicated by severe PPH. Patient was given pitocin, misoprostol, methergine, TXA. Ultimately required BERNA placement by in-house OB/GYN. QBL 2099 mL.\"        Hgb Lab Values:  -25 05:37  Hemoglobin: 11.4     -25 20:12  Hemoglobin: 11.3     -25 09:48  Hemoglobin: 11.3     -25 15:26  Hemoglobin: 9.6     -25 21:41  Hemoglobin: 9.2     -25 03:42  Hemoglobin: 8.5     -25 13:10  Hemoglobin: 8.7     -25 06:49  Hemoglobin: 8.9        Treatment:  -Continue to monitor     Mirian Keyes MD, PGY-3  [facility]    "

## 2025-01-23 NOTE — PROGRESS NOTES
"Clinic Care Coordination Contact  Transitions of Care Outreach  Chief Complaint   Patient presents with    Clinic Care Coordination - Post Hospital       Most Recent Admission Date: 1/18/2025   Most Recent Admission Diagnosis: Normal labor and delivery - O80     Most Recent Discharge Date: 1/22/2025   Most Recent Discharge Diagnosis: Normal labor and delivery - O80     Transitions of Care Assessment    Discharge Assessment  How are you doing now that you are home?: \" Doing well \"  How are your symptoms? (Red Flag symptoms escalate to triage hotline per guidelines): Improved  Do you know how to contact your clinic care team if you have future questions or changes to your health status? : Yes  Does the patient have their discharge instructions? : Yes  Does the patient have questions regarding their discharge instructions? : No  Were you started on any new medications or were there changes to any of your previous medications? : Yes  Does the patient have all of their medications?: Yes  Do you have questions regarding any of your medications? : No  Do you have all of your needed medical supplies or equipment (DME)?  (i.e. oxygen tank, CPAP, cane, etc.): Yes    Post-op ([facility] CTA Only)  If the patient had a surgery or procedure, do they have any questions for a nurse?: No           Follow up Plan     Discharge Follow-Up  Discharge follow up appointment scheduled in alignment with recommended follow up timeframe or Transitions of Risk Category? (Low = within 30 days; Moderate= within 14 days; High= within 7 days): No    No future appointments.    Outpatient Plan as outlined on AVS reviewed with patient.    For any urgent concerns, please contact our 24 hour nurse triage line: [phone] ([phone])       Kelly Haas MA              "

## 2025-02-04 ENCOUNTER — OFFICE VISIT (OUTPATIENT)
Dept: FAMILY MEDICINE | Facility: CLINIC | Age: 30
End: 2025-02-04
Payer: MEDICAID

## 2025-02-04 VITALS
OXYGEN SATURATION: 98 % | HEIGHT: 63 IN | DIASTOLIC BLOOD PRESSURE: 82 MMHG | RESPIRATION RATE: 20 BRPM | BODY MASS INDEX: 34.38 KG/M2 | HEART RATE: 73 BPM | WEIGHT: 194 LBS | TEMPERATURE: 98.2 F | SYSTOLIC BLOOD PRESSURE: 127 MMHG

## 2025-02-04 DIAGNOSIS — Z11.59 NEED FOR HEPATITIS C SCREENING TEST: ICD-10-CM

## 2025-02-04 DIAGNOSIS — Z11.4 SCREENING FOR HIV (HUMAN IMMUNODEFICIENCY VIRUS): Primary | ICD-10-CM

## 2025-02-04 DIAGNOSIS — D64.9 ANEMIA, UNSPECIFIED TYPE: ICD-10-CM

## 2025-02-04 RX ORDER — ACETAMINOPHEN 325 MG/1
975 TABLET ORAL EVERY 8 HOURS PRN
Qty: 50 TABLET | Status: CANCELLED | OUTPATIENT
Start: 2025-02-04

## 2025-02-04 NOTE — PROGRESS NOTES
Preceptor Attestation:   Patient seen, evaluated and discussed with the resident. I have verified the content of the note, which accurately reflects my assessment of the patient and the plan of care.    Supervising Physician:Tanya Silva MD    [facility]

## 2025-02-04 NOTE — PROGRESS NOTES
Dolores Box is a 29 year old  female presenting for routine postpartum follow up.    Date of delivery was 25 via .  Complications noted during the pregnancy include none.  Complications at the time of delivery include severe PPH. Patient was given pitocin, misoprostol, methergine, TXA. Ultimately required BERNA placement by in-house OB/GYN. QBL 2099 mL. .      Baby is doing good  She is just a little tired     laceration is healing well, pain has subsided    Still some pain from pelvic separation, but improved, walking  Pelvic XR from admission shows mild widening of symphysis pubis     Mood: denies concerns with postpartum blues/depression  Energy: good, no fatigue, normal hemoglobin of 11-13 on 2/3/25 at outside clinic    No return of period yet and no worrisome bleeding, bleeding has pretty much resolved, minor spotting    Breast/bottle feeding  Still taking prenatal vitamin     Other concerns today include none.    ROS:  General: No fevers.  Cardiac: No chest pain or palpitations.  Breasts: No redness, warmth, pain. Breastfeeding .  Pulmonary: No shortness of breath or respiratory distress.  GI: No abdominal pain.  Normal bowel habits, no incontinence.  : No dysuria, hematuria, no incontinence.  Extremities: No edema.    No Known Allergies    Current Outpatient Medications   Medication Sig Dispense Refill    acetaminophen (TYLENOL) 325 MG tablet Take 3 tablets (975 mg) by mouth every 8 hours as needed for pain. 50 tablet 0    fish oil-omega-3 fatty acids 500 MG capsule Take by mouth.      ibuprofen (ADVIL/MOTRIN) 800 MG tablet Take 1 tablet (800 mg) by mouth every 6 hours as needed for other (cramping). 50 tablet 0    Prenatal Vit-Fe Fumarate-FA (PRENATAL MULTIVITAMIN  PLUS IRON) 27-1 MG TABS Take 1 tablet by mouth daily.      calcium carbonate 750 MG CHEW Take 300 mg by mouth 4 times daily as needed (heartburn). (Patient not taking: Reported on 2025)         No past medical  "history on file.    OB History    Para Term  AB Living   1 1 1 0 0 1   SAB IAB Ectopic Multiple Live Births   0 0 0 0 1      # Outcome Date GA Lbr Donte/2nd Weight Sex Type Anes PTL Lv   1 Term 25 37w6d / 01:20 3.35 kg (7 lb 6.2 oz) F Vag-Spont EPI, Nitrous N ADELAIDA      Name: Mary Mcdonough      Apgar1: 9  Apgar5: 9       /82 (BP Location: Right arm, Patient Position: Sitting)   Pulse 73   Temp 98.2  F (36.8  C) (Oral)   Resp 20   Ht 1.612 m (5' 3.48\")   Wt 88 kg (194 lb)   LMP 2024 (Exact Date)   SpO2 98%   Breastfeeding Yes   BMI 33.84 kg/m       General: no apparent distress, appears well  Neck: no thyromegaly or cervical adenopathy  Cardiovascular: regular rate and rhythm without murmur  Pulmonary: clear to auscultation bilaterally without wheezing or crackles  Abdomen: Soft, non-tender.  No rebound or guarding.   GYN: uterus is normal non-gravid size and non-tender  Lower extremity: no significant swelling    Assessment/Plan  1. Routine Post Partum Care: Overall doing well and adjusting to new baby. Post partum depression has not been a concern. Goal to breastfeed. Desires barrier contraception. Recommended continuing prenatal vitamin during breastfeeding. Ok to resume normal activities without restriction.     Josh Lott  [facility]       Precepted with: Dr Silva         "

## 2025-04-09 ENCOUNTER — LAB (OUTPATIENT)
Dept: LAB | Facility: CLINIC | Age: 30
End: 2025-04-09
Payer: COMMERCIAL

## 2025-04-09 ENCOUNTER — TELEPHONE (OUTPATIENT)
Dept: FAMILY MEDICINE | Facility: CLINIC | Age: 30
End: 2025-04-09

## 2025-04-09 DIAGNOSIS — D64.9 ANEMIA, UNSPECIFIED TYPE: ICD-10-CM

## 2025-04-09 LAB — HGB BLD-MCNC: 11.7 G/DL (ref 11.7–15.7)

## 2025-04-09 NOTE — TELEPHONE ENCOUNTER
Test Results        Who ordered the test:  Tanya Silva    Type of test: Lab    Date of test:  4/9/2025    Where was the test performed:  [facility]    What are your questions/concerns?:  Patient returned VM for results. Results relayed, no further questions.

## 2025-08-19 ENCOUNTER — TRANSFERRED RECORDS (OUTPATIENT)
Dept: HEALTH INFORMATION MANAGEMENT | Facility: CLINIC | Age: 30
End: 2025-08-19
Payer: COMMERCIAL

## 2025-08-19 ENCOUNTER — MEDICAL CORRESPONDENCE (OUTPATIENT)
Dept: HEALTH INFORMATION MANAGEMENT | Facility: CLINIC | Age: 30
End: 2025-08-19
Payer: COMMERCIAL

## 2025-08-19 LAB
HBA1C MFR BLD: 5.3 %
TSH SERPL-ACNC: 2.27 MIU/L (ref 0.4–4.5)

## 2025-09-01 ENCOUNTER — PATIENT OUTREACH (OUTPATIENT)
Dept: CARE COORDINATION | Facility: CLINIC | Age: 30
End: 2025-09-01
Payer: COMMERCIAL

## 2025-09-03 ENCOUNTER — PATIENT OUTREACH (OUTPATIENT)
Dept: CARE COORDINATION | Facility: CLINIC | Age: 30
End: 2025-09-03
Payer: COMMERCIAL